# Patient Record
Sex: MALE | Race: WHITE | NOT HISPANIC OR LATINO | URBAN - METROPOLITAN AREA
[De-identification: names, ages, dates, MRNs, and addresses within clinical notes are randomized per-mention and may not be internally consistent; named-entity substitution may affect disease eponyms.]

---

## 2018-10-29 ENCOUNTER — EMERGENCY (EMERGENCY)
Facility: HOSPITAL | Age: 78
LOS: 1 days | Discharge: HOME | End: 2018-10-29
Attending: EMERGENCY MEDICINE
Payer: MEDICARE

## 2018-10-29 VITALS
RESPIRATION RATE: 18 BRPM | TEMPERATURE: 98 F | SYSTOLIC BLOOD PRESSURE: 146 MMHG | HEART RATE: 86 BPM | DIASTOLIC BLOOD PRESSURE: 86 MMHG | OXYGEN SATURATION: 96 %

## 2018-10-29 VITALS — WEIGHT: 188.94 LBS | HEIGHT: 67 IN

## 2018-10-29 DIAGNOSIS — K63.2 FISTULA OF INTESTINE: Chronic | ICD-10-CM

## 2018-10-29 DIAGNOSIS — K25.5 CHRONIC OR UNSPECIFIED GASTRIC ULCER WITH PERFORATION: Chronic | ICD-10-CM

## 2018-10-29 DIAGNOSIS — Z98.890 OTHER SPECIFIED POSTPROCEDURAL STATES: Chronic | ICD-10-CM

## 2018-10-29 LAB
ALBUMIN SERPL ELPH-MCNC: 4.2 G/DL — SIGNIFICANT CHANGE UP (ref 3.3–5.2)
ALP SERPL-CCNC: 52 U/L — SIGNIFICANT CHANGE UP (ref 40–120)
ALT FLD-CCNC: 13 U/L — SIGNIFICANT CHANGE UP
ANION GAP SERPL CALC-SCNC: 11 MMOL/L — SIGNIFICANT CHANGE UP (ref 5–17)
APTT BLD: 35.8 SEC — SIGNIFICANT CHANGE UP (ref 27.5–37.4)
AST SERPL-CCNC: 23 U/L — SIGNIFICANT CHANGE UP
BASOPHILS # BLD AUTO: 0 K/UL — SIGNIFICANT CHANGE UP (ref 0–0.2)
BASOPHILS NFR BLD AUTO: 0.3 % — SIGNIFICANT CHANGE UP (ref 0–2)
BILIRUB SERPL-MCNC: 0.6 MG/DL — SIGNIFICANT CHANGE UP (ref 0.4–2)
BUN SERPL-MCNC: 10 MG/DL — SIGNIFICANT CHANGE UP (ref 8–20)
CALCIUM SERPL-MCNC: 9.9 MG/DL — SIGNIFICANT CHANGE UP (ref 8.6–10.2)
CHLORIDE SERPL-SCNC: 99 MMOL/L — SIGNIFICANT CHANGE UP (ref 98–107)
CO2 SERPL-SCNC: 24 MMOL/L — SIGNIFICANT CHANGE UP (ref 22–29)
CREAT SERPL-MCNC: 0.7 MG/DL — SIGNIFICANT CHANGE UP (ref 0.5–1.3)
EOSINOPHIL # BLD AUTO: 0.1 K/UL — SIGNIFICANT CHANGE UP (ref 0–0.5)
EOSINOPHIL NFR BLD AUTO: 1.7 % — SIGNIFICANT CHANGE UP (ref 0–5)
GLUCOSE SERPL-MCNC: 103 MG/DL — SIGNIFICANT CHANGE UP (ref 70–115)
HCT VFR BLD CALC: 41 % — LOW (ref 42–52)
HGB BLD-MCNC: 14.5 G/DL — SIGNIFICANT CHANGE UP (ref 14–18)
INR BLD: 1.1 RATIO — SIGNIFICANT CHANGE UP (ref 0.88–1.16)
LACTATE BLDV-MCNC: 0.9 MMOL/L — SIGNIFICANT CHANGE UP (ref 0.5–2)
LYMPHOCYTES # BLD AUTO: 1.7 K/UL — SIGNIFICANT CHANGE UP (ref 1–4.8)
LYMPHOCYTES # BLD AUTO: 22.7 % — SIGNIFICANT CHANGE UP (ref 20–55)
MCHC RBC-ENTMCNC: 30.9 PG — SIGNIFICANT CHANGE UP (ref 27–31)
MCHC RBC-ENTMCNC: 35.4 G/DL — SIGNIFICANT CHANGE UP (ref 32–36)
MCV RBC AUTO: 87.2 FL — SIGNIFICANT CHANGE UP (ref 80–94)
MONOCYTES # BLD AUTO: 0.6 K/UL — SIGNIFICANT CHANGE UP (ref 0–0.8)
MONOCYTES NFR BLD AUTO: 7.2 % — SIGNIFICANT CHANGE UP (ref 3–10)
NEUTROPHILS # BLD AUTO: 5.2 K/UL — SIGNIFICANT CHANGE UP (ref 1.8–8)
NEUTROPHILS NFR BLD AUTO: 68 % — SIGNIFICANT CHANGE UP (ref 37–73)
PLATELET # BLD AUTO: 265 K/UL — SIGNIFICANT CHANGE UP (ref 150–400)
POTASSIUM SERPL-MCNC: 4.9 MMOL/L — SIGNIFICANT CHANGE UP (ref 3.5–5.3)
POTASSIUM SERPL-SCNC: 4.9 MMOL/L — SIGNIFICANT CHANGE UP (ref 3.5–5.3)
PROT SERPL-MCNC: 7.1 G/DL — SIGNIFICANT CHANGE UP (ref 6.6–8.7)
PROTHROM AB SERPL-ACNC: 12.1 SEC — SIGNIFICANT CHANGE UP (ref 9.8–12.7)
RBC # BLD: 4.7 M/UL — SIGNIFICANT CHANGE UP (ref 4.6–6.2)
RBC # FLD: 13.8 % — SIGNIFICANT CHANGE UP (ref 11–15.6)
SODIUM SERPL-SCNC: 134 MMOL/L — LOW (ref 135–145)
WBC # BLD: 7.6 K/UL — SIGNIFICANT CHANGE UP (ref 4.8–10.8)
WBC # FLD AUTO: 7.6 K/UL — SIGNIFICANT CHANGE UP (ref 4.8–10.8)

## 2018-10-29 PROCEDURE — 83690 ASSAY OF LIPASE: CPT

## 2018-10-29 PROCEDURE — 85610 PROTHROMBIN TIME: CPT

## 2018-10-29 PROCEDURE — 74177 CT ABD & PELVIS W/CONTRAST: CPT

## 2018-10-29 PROCEDURE — 80053 COMPREHEN METABOLIC PANEL: CPT

## 2018-10-29 PROCEDURE — 96374 THER/PROPH/DIAG INJ IV PUSH: CPT | Mod: XU

## 2018-10-29 PROCEDURE — 36415 COLL VENOUS BLD VENIPUNCTURE: CPT

## 2018-10-29 PROCEDURE — 87040 BLOOD CULTURE FOR BACTERIA: CPT

## 2018-10-29 PROCEDURE — 74177 CT ABD & PELVIS W/CONTRAST: CPT | Mod: 26

## 2018-10-29 PROCEDURE — 85730 THROMBOPLASTIN TIME PARTIAL: CPT

## 2018-10-29 PROCEDURE — 83605 ASSAY OF LACTIC ACID: CPT

## 2018-10-29 PROCEDURE — 85027 COMPLETE CBC AUTOMATED: CPT

## 2018-10-29 PROCEDURE — 99284 EMERGENCY DEPT VISIT MOD MDM: CPT

## 2018-10-29 PROCEDURE — 99284 EMERGENCY DEPT VISIT MOD MDM: CPT | Mod: 25

## 2018-10-29 RX ORDER — FLUOCINONIDE/EMOLLIENT BASE 0.05 %
1 CREAM (GRAM) TOPICAL ONCE
Qty: 0 | Refills: 0 | Status: COMPLETED | OUTPATIENT
Start: 2018-10-29 | End: 2018-10-29

## 2018-10-29 RX ORDER — CEPHALEXIN 500 MG
500 CAPSULE ORAL ONCE
Qty: 0 | Refills: 0 | Status: COMPLETED | OUTPATIENT
Start: 2018-10-29 | End: 2018-10-29

## 2018-10-29 RX ORDER — PIPERACILLIN AND TAZOBACTAM 4; .5 G/20ML; G/20ML
3.38 INJECTION, POWDER, LYOPHILIZED, FOR SOLUTION INTRAVENOUS ONCE
Qty: 0 | Refills: 0 | Status: COMPLETED | OUTPATIENT
Start: 2018-10-29 | End: 2018-10-29

## 2018-10-29 RX ORDER — FLUOCINONIDE/EMOLLIENT BASE 0.05 %
1 CREAM (GRAM) TOPICAL
Qty: 30 | Refills: 0 | OUTPATIENT
Start: 2018-10-29 | End: 2018-11-04

## 2018-10-29 RX ORDER — CEPHALEXIN 500 MG
1 CAPSULE ORAL
Qty: 21 | Refills: 0
Start: 2018-10-29 | End: 2018-11-04

## 2018-10-29 RX ORDER — FLUOCINONIDE/EMOLLIENT BASE 0.05 %
1 CREAM (GRAM) TOPICAL
Qty: 60 | Refills: 0
Start: 2018-10-29 | End: 2018-11-04

## 2018-10-29 RX ADMIN — Medication 500 MILLIGRAM(S): at 17:56

## 2018-10-29 RX ADMIN — PIPERACILLIN AND TAZOBACTAM 200 GRAM(S): 4; .5 INJECTION, POWDER, LYOPHILIZED, FOR SOLUTION INTRAVENOUS at 14:14

## 2018-10-29 RX ADMIN — Medication 1 APPLICATION(S): at 18:01

## 2018-10-29 NOTE — ED ADULT NURSE NOTE - CAS EDN DISCHARGE ASSESSMENT
Head is atraumatic. Head shape is symmetrical. Alert and oriented to person, place and time/Awake/Symptoms improved

## 2018-10-29 NOTE — ED ADULT NURSE NOTE - NSIMPLEMENTINTERV_GEN_ALL_ED
Implemented All Universal Safety Interventions:  [place] to call system. Call bell, personal items and telephone within reach. Instruct patient to call for assistance. Room bathroom lighting operational. Non-slip footwear when patient is off stretcher. Physically safe environment: no spills, clutter or unnecessary equipment. Stretcher in lowest position, wheels locked, appropriate side rails in place.

## 2018-10-29 NOTE — ED STATDOCS - OBJECTIVE STATEMENT
Telemedicine assessment was conducted (using real time 2 way audio-video technology) by Dr. Carl Handley located at [address] [place] [id number]  ++++++++++++++++++++++++  Pertinent patient history and initial plan: 79 y/o M pt presents to the ED c/o abdominal pain from old surgical wound onset 7 days ago. He had an old operation in [place] in the 70's. They cut 3/4 of his stomach and did a pancreatic scrape. 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 The wound was open for 14 months before it closed. It took 14 times to close the wound completely. This was all performed in [place]. Since this time he has had no problems. He used peroxide and bacitracin, and it seemed to be clearing up, but has worsened over the last week. The wound site is red, but dry. It is at the same site where it was draining in the past. Pt states subjective fevers. He takes a cranberry pill, and vitamin D 2,000 mg twice daily, and 81mg of Asprin daily. He took Motrin for the pain with relief. Pt denies N/V/D. No further complaints at this time. Telemedicine assessment was conducted (using real time 2 way audio-video technology) by Dr. Cral Handley located at [address] [place] [id number]  ++++++++++++++++++++++++  Pertinent patient history and initial plan: 79 y/o M pt presents to the ED c/o abdominal pain from old surgical wound onset 7 days ago. He had an old operation in [place] in the 70's. They cut 3/4 of his stomach and did a pancreatic scrape. 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 The wound was open for 14 months before it closed. It took 14 times to close the wound completely. This was all performed in [place]. Since this time he has had no problems. Over the past week, patient noticed redness and swelling to same area. no drainage.  The wound site is red, but dry. It is at the same site where it was draining in the past. Pt states subjective fevers. He takes a cranberry pill, and vitamin D 2,000 mg twice daily, and 81mg of Asprin daily. He took Motrin for the pain with relief. Pt denies N/V/D. No further complaints at this time.    will order, labs, ct to evaluate fistula    Patient seen by me in intake for initial assessment and ordering. Physician on site to follow results and further evaluate and treat patient.

## 2018-10-29 NOTE — ED PROVIDER NOTE - ATTENDING CONTRIBUTION TO CARE
seen with acp: well appearing, normal mucosa, NAD, no icterus; abd soft, NT ND; RLQ with small fistula wound/scar with minimal clear drainage and surrounding dermatitis; labs and imaging reviewed, surg consult noted, agree with acp plan of care

## 2018-10-29 NOTE — ED PROVIDER NOTE - PROGRESS NOTE DETAILS
Pt seen by surgery who felt that drainage was related to local dermatitis to area. Recoomeding Steriod cream and oral abx

## 2018-10-29 NOTE — ED ADULT TRIAGE NOTE - CHIEF COMPLAINT QUOTE
has old abd wound with pancreatic scraping from the 1970's. Has a small abd opening, chronic, Here now for increased drainage and pain.

## 2018-10-29 NOTE — PROGRESS NOTE ADULT - ASSESSMENT
A/P:    Cleared for discharge from surgical standpoint. there is no fistula or collection. The skin at the base of the dimple of former fistula site is intact  -This appears to be a dermatitis reaction, weeping clear serous fluid. Treat for dermatitis, and recommend short course of PO antibiotics  -Patient needs to sign an incidental finding form for the liver mass found; and needs to follow up with his PMD; he was counseled on the importance of doing so    Patient seen by me and Gretta Wang, attending A/P:    Cleared for discharge from surgical standpoint. there is no fistula or collection. The skin at the base of the dimple of former fistula site is intact  -This appears to be a dermatitis reaction, weeping clear serous fluid. Treat for dermatitis, and recommend short course of PO antibiotics  -Patient signed an incidental finding form for the liver mass found; and was advised needs to follow up with his PMD; he was counseled on the importance of doing so, and given a copy of his CT scan result  -Patient may follow up with [facility] surgery clinic in 1 week if he wishes to do so. Contact information was provided    Patient seen by me and Gretta Wang, attending

## 2018-10-29 NOTE — PROGRESS NOTE ADULT - SUBJECTIVE AND OBJECTIVE BOX
INTERVAL HPI/OVERNIGHT EVENTS:    HPI:  79 yo Nepali male presents with concern for drainage from a previous surgical site. He recounts in [facility] in [facility], complicated prolonged hospital course for what he remembers is a "stomach ulcer perforation, which involved my pancreas, they took a shaving of it. I developed a pancreatic fistula and I was in the hospital for a month, very sick."  he recounts being discharged, and multiple readmissions in the months following for incision and drainage of what he recalls is an abscess of a previous fistula site on his right side of the abdomen. He also recalls having several sutures removed from that site.  And in 1974 his symptoms resolved, and he continued in his usual state of health denying any drainage from this former fistula site, until 1 week ago when he reports clear drainage which was alarming to him. he seeks attention today for concern for this drainage.    Since then, he's been treating it with some cream at home, but the drainage increased.  He denies any other symptoms, including denying F/C/SOB/CP, Denies N/V, Denies diarrhea/constipation. Denies blood in BMs. Denies dysuria.     ROS:  HEENT: Denies headache, blurry vision  RESPIRATORY: Denies cough  CARDIAC: Denies chest pain, racing heart  GASTROINTESTINAL: Denies, constipation, diarrhea  GENITOURINARY: Denies dysuria, hematuria  NEUROLOGIC: Denies headaches, dizziness  MUSCULOSKELETAL: Denies muscle weakness  VASCULAR: Denies claudication and cramping.  ENDOCRINOLOGY: Denies heat or cold intolerance  HEMATOLOGY: Denies easy bleeding or blood transfusion.  DERMATOLOGY: Denies changes in moles or pigmentation changes  PSYCHIATRY: Denies depression, agitation or anxiety      PMH: Denies    PSH: as above  +  elective R and L inguinal hernia repairs (open) with mesh, [place] several years ago.    Medications: none    All: NKDA    Soc Hx:  Smokes 1 PPD approx 25 y. Denies illicits    MEDICATIONS  (STANDING):  cephalexin 500 milliGRAM(s) Oral once  fluocinonide 0.05% Cream 1 Application(s) Topical Once      Vital Signs Last 24 Hrs  T(C): 36.9 (29 Oct 2018 15:23), Max: 37.4 (29 Oct 2018 10:42)  T(F): 98.4 (29 Oct 2018 15:23), Max: 99.3 (29 Oct 2018 10:42)  HR: 90 (29 Oct 2018 15:23) (88 - 90)  BP: 149/91 (29 Oct 2018 15:23) (149/91 - 150/83)  BP(mean): --  RR: 18 (29 Oct 2018 15:23) (18 - 18)  SpO2: 95% (29 Oct 2018 15:23) (94% - 95%)    PE  Gen: NAD AAO3, vivacious  Pulm: CTAB, symmetrical  CV: RRR S1/S2  Abd: Well healed midline laparotomy wound. Well healed R sided vertical incision approx 5cm which is centered on a skin dimple, at the midclavicular line at the level of the umbilicus. On probing this dimple, there is intact skin at the base, and it does not appear to be a fistula. There is a dermatitic eruption with weeping vesicles, also centered around this dimple, which does not appear to be a natalie cellulitis. No fluctuance, pus or pain on palpation anywhere in abdomen. The fluid weeping from the skin is clear serous.  Well healed B/L inguinal hernia incisions  : Deferred.  Ext: WWP  Vasc: 2+ pulses x4 ext  Neuro: CN II-XII intact, nonfocal      I&O's Detail      LABS:                        14.5   7.6   )-----------( 265      ( 29 Oct 2018 11:46 )             41.0     10-29    134<L>  |  99  |  10.0  ----------------------------<  103  4.9   |  24.0  |  0.70    Ca    9.9      29 Oct 2018 11:46    TPro  7.1  /  Alb  4.2  /  TBili  0.6  /  DBili  x   /  AST  23  /  ALT  13  /  AlkPhos  52  10-29    PT/INR - ( 29 Oct 2018 12:45 )   PT: 12.1 sec;   INR: 1.10 ratio         PTT - ( 29 Oct 2018 12:45 )  PTT:35.8 sec      RADIOLOGY & ADDITIONAL STUDIES:    CT A/P:  2.2x 1.5cm solid mass in R lobe of Liver  -No fistula, no hernia or collection

## 2018-10-29 NOTE — ED PROVIDER NOTE - PSH
No significant past surgical history Abdominal fistula    Gastric perforation    H/O bilateral inguinal hernia repair

## 2018-10-29 NOTE — CHART NOTE - NSCHARTNOTEFT_GEN_A_CORE
Full surgical consult note to follow.    78M with complicated surgical history nearly 40 years ago in [place] for perforated ulcer requiring multiple operations complicated by necrotizing pancreatitis requiring multiple debridements and then post operative pancreatic fistula that required several months of management and fistulectomy.  Since 40 years ago, he reports no problems. Eating well. Mild constipation but other wise normal bowel function. No fevers or chills. He comes to ER because he noted itchiness and redness around previous drain site of right mid abdomen with small amount of serous drainage. he states it felt like a bug bite, very itchy around the site and then due to concerns of recurrent fistula, presented to the ED.  HE reports no fevers, chills, nausea, abdominal pain, diarrhea, food intolerance. Mild constipation.    Vitals: AVSS  Constitutional: NAD, AAO3  HEENT: PERRL, EOMI  CV: RRR  RESP: CTAB  GI: soft, nondistended, nontender, no guarding, no rebound, well healed midline laparotomy scar, right mid abdomen with well healed drain site scar that indents inward surrounded by erythema and mild serous drainage from visible skin breaks.  Gently probed bottom of the drain site. It is well healed with no evidence of fistulous output. No opening appreciated. No surrounding evidence of underlying fluctuance, infection, or fluid collection.  Small bullae? superficial just superior to drain site scar.  MSK: no edema  Neuro: no focal deficits    No leukocytosis  BMP unremarkable  CT scan: no intraaabodminal pathology appreciated. no fluid collections. no evidence of fistulous tracks. No inflammation of subcutaneous tissue.    A/p 78M with dermatitis of right abdomen  - no evidence of fistula on physical exam or CT scan.  Scant serous fluid appears to be small superfical breaks of skin noted with extensive erythema of right abdomen. Small bullae? noted near drain site scar may possibly due to bug bite? Unclear etiology for itchy rash of right abdomen but there is no evidence of infection nor fistulous opening nor fluctuance requiring intervention.  - no acute surgical intervention indicated  - recommend treatment for dermatitis/cellulitis of abdomen  - if patient has on going concerns of scar sites or has change in abdominal exam, patient can follow up in [facility] clinic.

## 2018-10-29 NOTE — ED ADULT NURSE NOTE - OBJECTIVE STATEMENT
age appropriate
Patient A&OX4, has old abd wound with pancreatic scraping from the 1970's. Has a small abd opening. Here now for increased drainage. redness noted around the wound. .

## 2018-10-29 NOTE — ED PROVIDER NOTE - OBJECTIVE STATEMENT
77 y/o M denies any PMH  presents to the ED c/o abdominal pain from old surgical wound onset 7 days ago. He had gastric ulcer/perf  in [place] in the 70's and states that they cut 3/4 of his stomach and did a pancreatic scrape.  Pt then developed a fistula- had multiple surgery to close the fistula. Since this time he has had no problems. Over the past week, patient noticed redness and swelling to same area with drainage. + Subjective fevers. No N/V/D/C  Meds- vitamin D 2,000 mg twice daily, and 81mg of Asprin daily.

## 2018-11-03 LAB
CULTURE RESULTS: SIGNIFICANT CHANGE UP
CULTURE RESULTS: SIGNIFICANT CHANGE UP
SPECIMEN SOURCE: SIGNIFICANT CHANGE UP
SPECIMEN SOURCE: SIGNIFICANT CHANGE UP

## 2018-11-24 ENCOUNTER — EMERGENCY (EMERGENCY)
Facility: HOSPITAL | Age: 78
LOS: 1 days | Discharge: HOME | End: 2018-11-24
Attending: EMERGENCY MEDICINE
Payer: MEDICARE

## 2018-11-24 VITALS
OXYGEN SATURATION: 99 % | HEART RATE: 68 BPM | RESPIRATION RATE: 18 BRPM | DIASTOLIC BLOOD PRESSURE: 72 MMHG | SYSTOLIC BLOOD PRESSURE: 162 MMHG

## 2018-11-24 VITALS — WEIGHT: 143.96 LBS

## 2018-11-24 DIAGNOSIS — K63.2 FISTULA OF INTESTINE: Chronic | ICD-10-CM

## 2018-11-24 DIAGNOSIS — Z98.890 OTHER SPECIFIED POSTPROCEDURAL STATES: Chronic | ICD-10-CM

## 2018-11-24 DIAGNOSIS — K25.5 CHRONIC OR UNSPECIFIED GASTRIC ULCER WITH PERFORATION: Chronic | ICD-10-CM

## 2018-11-24 LAB
ALBUMIN SERPL ELPH-MCNC: 5 G/DL — SIGNIFICANT CHANGE UP (ref 3.3–5.2)
ALP SERPL-CCNC: 59 U/L — SIGNIFICANT CHANGE UP (ref 40–120)
ALT FLD-CCNC: 12 U/L — SIGNIFICANT CHANGE UP
ANION GAP SERPL CALC-SCNC: 11 MMOL/L — SIGNIFICANT CHANGE UP (ref 5–17)
APPEARANCE UR: CLEAR — SIGNIFICANT CHANGE UP
APTT BLD: 37.9 SEC — HIGH (ref 27.5–36.3)
AST SERPL-CCNC: 19 U/L — SIGNIFICANT CHANGE UP
BACTERIA # UR AUTO: ABNORMAL
BASOPHILS # BLD AUTO: 0 K/UL — SIGNIFICANT CHANGE UP (ref 0–0.2)
BASOPHILS NFR BLD AUTO: 0.2 % — SIGNIFICANT CHANGE UP (ref 0–2)
BILIRUB SERPL-MCNC: 0.6 MG/DL — SIGNIFICANT CHANGE UP (ref 0.4–2)
BILIRUB UR-MCNC: NEGATIVE — SIGNIFICANT CHANGE UP
BUN SERPL-MCNC: 11 MG/DL — SIGNIFICANT CHANGE UP (ref 8–20)
CALCIUM SERPL-MCNC: 10.1 MG/DL — SIGNIFICANT CHANGE UP (ref 8.6–10.2)
CHLORIDE SERPL-SCNC: 96 MMOL/L — LOW (ref 98–107)
CO2 SERPL-SCNC: 29 MMOL/L — SIGNIFICANT CHANGE UP (ref 22–29)
COLOR SPEC: YELLOW — SIGNIFICANT CHANGE UP
COMMENT - URINE: SIGNIFICANT CHANGE UP
CREAT SERPL-MCNC: 0.72 MG/DL — SIGNIFICANT CHANGE UP (ref 0.5–1.3)
DIFF PNL FLD: ABNORMAL
EOSINOPHIL # BLD AUTO: 0.4 K/UL — SIGNIFICANT CHANGE UP (ref 0–0.5)
EOSINOPHIL NFR BLD AUTO: 4.6 % — SIGNIFICANT CHANGE UP (ref 0–5)
EPI CELLS # UR: SIGNIFICANT CHANGE UP
GLUCOSE SERPL-MCNC: 90 MG/DL — SIGNIFICANT CHANGE UP (ref 70–115)
GLUCOSE UR QL: NEGATIVE MG/DL — SIGNIFICANT CHANGE UP
HCT VFR BLD CALC: 47.1 % — SIGNIFICANT CHANGE UP (ref 42–52)
HGB BLD-MCNC: 16.3 G/DL — SIGNIFICANT CHANGE UP (ref 14–18)
INR BLD: 1.05 RATIO — SIGNIFICANT CHANGE UP (ref 0.88–1.16)
KETONES UR-MCNC: NEGATIVE — SIGNIFICANT CHANGE UP
LACTATE BLDV-MCNC: 0.9 MMOL/L — SIGNIFICANT CHANGE UP (ref 0.5–2)
LEUKOCYTE ESTERASE UR-ACNC: ABNORMAL
LIDOCAIN IGE QN: 26 U/L — SIGNIFICANT CHANGE UP (ref 22–51)
LYMPHOCYTES # BLD AUTO: 2.1 K/UL — SIGNIFICANT CHANGE UP (ref 1–4.8)
LYMPHOCYTES # BLD AUTO: 25.3 % — SIGNIFICANT CHANGE UP (ref 20–55)
MAGNESIUM SERPL-MCNC: 1.8 MG/DL — SIGNIFICANT CHANGE UP (ref 1.6–2.6)
MCHC RBC-ENTMCNC: 31 PG — SIGNIFICANT CHANGE UP (ref 27–31)
MCHC RBC-ENTMCNC: 34.6 G/DL — SIGNIFICANT CHANGE UP (ref 32–36)
MCV RBC AUTO: 89.5 FL — SIGNIFICANT CHANGE UP (ref 80–94)
MONOCYTES # BLD AUTO: 0.6 K/UL — SIGNIFICANT CHANGE UP (ref 0–0.8)
MONOCYTES NFR BLD AUTO: 7.9 % — SIGNIFICANT CHANGE UP (ref 3–10)
NEUTROPHILS # BLD AUTO: 5 K/UL — SIGNIFICANT CHANGE UP (ref 1.8–8)
NEUTROPHILS NFR BLD AUTO: 61.9 % — SIGNIFICANT CHANGE UP (ref 37–73)
NITRITE UR-MCNC: NEGATIVE — SIGNIFICANT CHANGE UP
PH UR: 7 — SIGNIFICANT CHANGE UP (ref 5–8)
PLATELET # BLD AUTO: 261 K/UL — SIGNIFICANT CHANGE UP (ref 150–400)
POTASSIUM SERPL-MCNC: 4.8 MMOL/L — SIGNIFICANT CHANGE UP (ref 3.5–5.3)
POTASSIUM SERPL-SCNC: 4.8 MMOL/L — SIGNIFICANT CHANGE UP (ref 3.5–5.3)
PROT SERPL-MCNC: 8 G/DL — SIGNIFICANT CHANGE UP (ref 6.6–8.7)
PROT UR-MCNC: 15 MG/DL
PROTHROM AB SERPL-ACNC: 12.1 SEC — SIGNIFICANT CHANGE UP (ref 10–12.9)
RBC # BLD: 5.26 M/UL — SIGNIFICANT CHANGE UP (ref 4.6–6.2)
RBC # FLD: 14 % — SIGNIFICANT CHANGE UP (ref 11–15.6)
RBC CASTS # UR COMP ASSIST: SIGNIFICANT CHANGE UP /HPF (ref 0–4)
SODIUM SERPL-SCNC: 136 MMOL/L — SIGNIFICANT CHANGE UP (ref 135–145)
SP GR SPEC: 1.01 — SIGNIFICANT CHANGE UP (ref 1.01–1.02)
TROPONIN T SERPL-MCNC: <0.01 NG/ML — SIGNIFICANT CHANGE UP (ref 0–0.06)
UROBILINOGEN FLD QL: NEGATIVE MG/DL — SIGNIFICANT CHANGE UP
WBC # BLD: 8.1 K/UL — SIGNIFICANT CHANGE UP (ref 4.8–10.8)
WBC # FLD AUTO: 8.1 K/UL — SIGNIFICANT CHANGE UP (ref 4.8–10.8)
WBC UR QL: SIGNIFICANT CHANGE UP

## 2018-11-24 PROCEDURE — 36415 COLL VENOUS BLD VENIPUNCTURE: CPT

## 2018-11-24 PROCEDURE — 85730 THROMBOPLASTIN TIME PARTIAL: CPT

## 2018-11-24 PROCEDURE — 84484 ASSAY OF TROPONIN QUANT: CPT

## 2018-11-24 PROCEDURE — 96374 THER/PROPH/DIAG INJ IV PUSH: CPT | Mod: XU

## 2018-11-24 PROCEDURE — 83605 ASSAY OF LACTIC ACID: CPT

## 2018-11-24 PROCEDURE — 99284 EMERGENCY DEPT VISIT MOD MDM: CPT | Mod: 25

## 2018-11-24 PROCEDURE — 87086 URINE CULTURE/COLONY COUNT: CPT

## 2018-11-24 PROCEDURE — 74177 CT ABD & PELVIS W/CONTRAST: CPT | Mod: 26

## 2018-11-24 PROCEDURE — 81001 URINALYSIS AUTO W/SCOPE: CPT

## 2018-11-24 PROCEDURE — 83690 ASSAY OF LIPASE: CPT

## 2018-11-24 PROCEDURE — 85610 PROTHROMBIN TIME: CPT

## 2018-11-24 PROCEDURE — 85027 COMPLETE CBC AUTOMATED: CPT

## 2018-11-24 PROCEDURE — 74177 CT ABD & PELVIS W/CONTRAST: CPT

## 2018-11-24 PROCEDURE — 83735 ASSAY OF MAGNESIUM: CPT

## 2018-11-24 PROCEDURE — 99284 EMERGENCY DEPT VISIT MOD MDM: CPT

## 2018-11-24 PROCEDURE — 80053 COMPREHEN METABOLIC PANEL: CPT

## 2018-11-24 PROCEDURE — 96375 TX/PRO/DX INJ NEW DRUG ADDON: CPT

## 2018-11-24 RX ORDER — SODIUM CHLORIDE 9 MG/ML
1000 INJECTION INTRAMUSCULAR; INTRAVENOUS; SUBCUTANEOUS
Qty: 0 | Refills: 0 | Status: DISCONTINUED | OUTPATIENT
Start: 2018-11-24 | End: 2018-11-29

## 2018-11-24 RX ORDER — AMPICILLIN SODIUM AND SULBACTAM SODIUM 250; 125 MG/ML; MG/ML
3 INJECTION, POWDER, FOR SUSPENSION INTRAMUSCULAR; INTRAVENOUS ONCE
Qty: 0 | Refills: 0 | Status: COMPLETED | OUTPATIENT
Start: 2018-11-24 | End: 2018-11-24

## 2018-11-24 RX ORDER — FENTANYL CITRATE 50 UG/ML
12.5 INJECTION INTRAVENOUS ONCE
Qty: 0 | Refills: 0 | Status: DISCONTINUED | OUTPATIENT
Start: 2018-11-24 | End: 2018-11-24

## 2018-11-24 RX ADMIN — SODIUM CHLORIDE 100 MILLILITER(S): 9 INJECTION INTRAMUSCULAR; INTRAVENOUS; SUBCUTANEOUS at 06:57

## 2018-11-24 RX ADMIN — AMPICILLIN SODIUM AND SULBACTAM SODIUM 200 GRAM(S): 250; 125 INJECTION, POWDER, FOR SUSPENSION INTRAMUSCULAR; INTRAVENOUS at 12:01

## 2018-11-24 RX ADMIN — FENTANYL CITRATE 12.5 MICROGRAM(S): 50 INJECTION INTRAVENOUS at 09:46

## 2018-11-24 NOTE — ED PROVIDER NOTE - NS ED ROS FT
no weight change, no fever or chills  no recent travel, no recent abox, no sick contacts  no recent change in medications  no rash, no bruises  no visual changes no eye discharge  no cough cold or congestion,   no sob, no chest pain  no orthopnea, no pnd  +abd pain, no n/v/d  no hematuria, no change in urinary habits  no joint pain, no deformity  no headache, no paresthesia

## 2018-11-24 NOTE — ED PROVIDER NOTE - OBJECTIVE STATEMENT
78y old with h/o gastric ulcer, has fistula t that time. has been doing well. was seen seen in ed few days ago recd a medication, now repeat visit, complaints od ruq tenderness at one point

## 2018-11-24 NOTE — ED ADULT NURSE NOTE - NSIMPLEMENTINTERV_GEN_ALL_ED
Implemented All Universal Safety Interventions:  [place] to call system. Call bell, personal items and telephone within reach. Instruct patient to call for assistance. Room bathroom lighting operational. Non-slip footwear when patient is off stretcher. Physically safe environment: no spills, clutter or unnecessary equipment. Stretcher in lowest position, wheels locked, appropriate side rails in place.

## 2018-11-24 NOTE — ED ADULT NURSE REASSESSMENT NOTE - NS ED NURSE REASSESS COMMENT FT1
pt care assumed at 0730, no apparent distress noted at this time, charting as noted. pt received Alert and Oriented to person, place, situation and time sitting in bed comfortably. pt a/o abd pain. pt is currently awaiting ct scan. urine sent at this time. pt educated on plan of care, pt able to successfully teach back plan of care to RN, RN will continue to reeducate pt during hospital stay.

## 2018-11-24 NOTE — ED PROVIDER NOTE - CARE PLAN
Principal Discharge DX:	Abdominal wall cellulitis Principal Discharge DX:	Abdominal wall cellulitis  Secondary Diagnosis:	Abdominal pain, unspecified abdominal location

## 2018-11-24 NOTE — ED ADULT NURSE NOTE - OBJECTIVE STATEMENT
Patient A&Ox4 complaining of pain to abdominal area secondary to rash. Patient describes pain as burning like sensation. Patient presents with redness & scaly patches to mid-abdominal area. Stated was seen in ED appx 30 days ago, was discharged with PO ABX & topical cream. Stated rash has gotten worse, however has remained exclusive to abdominal area only. Discharge instructions from previous ED visit shows patient discharged with contact dermatitis.

## 2018-11-24 NOTE — ED PROVIDER NOTE - PHYSICAL EXAMINATION
Constitutional : Appears comfortably, talking in full sentences  Head :NC AT , no swelling  Eyes :eomi, no swelling  Mouth :mm moist,  Neck : supple, trachea in midline  Chest :Erick air entry, symm chest expansion, no distress  Heart :S1 S2 distant  Abdomen :abd soft, right upper quadrant  tenderness  right mid abd a fistula, guaze is dry, no groin erythema  erythema rectangula on right side of abd  with yellow heads, appears warm, no crops, no blisters noted  Musc/Skel :ext no swelling, no deformity, no spine tenderness, distal pulses present  Neuro  :AAO 3 no focal deficits

## 2018-11-25 LAB
CULTURE RESULTS: NO GROWTH — SIGNIFICANT CHANGE UP
SPECIMEN SOURCE: SIGNIFICANT CHANGE UP

## 2018-11-28 PROBLEM — Z00.00 ENCOUNTER FOR PREVENTIVE HEALTH EXAMINATION: Status: ACTIVE | Noted: 2018-11-28

## 2018-12-06 ENCOUNTER — APPOINTMENT (OUTPATIENT)
Dept: DERMATOLOGY | Facility: CLINIC | Age: 78
End: 2018-12-06
Payer: MEDICARE

## 2018-12-06 PROCEDURE — 99202 OFFICE O/P NEW SF 15 MIN: CPT

## 2019-02-06 ENCOUNTER — APPOINTMENT (OUTPATIENT)
Dept: DERMATOLOGY | Facility: CLINIC | Age: 79
End: 2019-02-06

## 2021-03-29 ENCOUNTER — EMERGENCY (EMERGENCY)
Facility: HOSPITAL | Age: 81
LOS: 1 days | Discharge: HOME | End: 2021-03-29
Attending: EMERGENCY MEDICINE
Payer: MEDICARE

## 2021-03-29 VITALS
HEIGHT: 67 IN | WEIGHT: 139.99 LBS | SYSTOLIC BLOOD PRESSURE: 180 MMHG | DIASTOLIC BLOOD PRESSURE: 80 MMHG | HEART RATE: 84 BPM | OXYGEN SATURATION: 98 % | RESPIRATION RATE: 18 BRPM | TEMPERATURE: 98 F

## 2021-03-29 VITALS
RESPIRATION RATE: 18 BRPM | SYSTOLIC BLOOD PRESSURE: 151 MMHG | OXYGEN SATURATION: 97 % | DIASTOLIC BLOOD PRESSURE: 72 MMHG | HEART RATE: 79 BPM

## 2021-03-29 DIAGNOSIS — K63.2 FISTULA OF INTESTINE: Chronic | ICD-10-CM

## 2021-03-29 DIAGNOSIS — K25.5 CHRONIC OR UNSPECIFIED GASTRIC ULCER WITH PERFORATION: Chronic | ICD-10-CM

## 2021-03-29 DIAGNOSIS — Z98.890 OTHER SPECIFIED POSTPROCEDURAL STATES: Chronic | ICD-10-CM

## 2021-03-29 LAB
ALBUMIN SERPL ELPH-MCNC: 4.2 G/DL — SIGNIFICANT CHANGE UP (ref 3.3–5.2)
ALP SERPL-CCNC: 49 U/L — SIGNIFICANT CHANGE UP (ref 40–120)
ALT FLD-CCNC: 14 U/L — SIGNIFICANT CHANGE UP
ANION GAP SERPL CALC-SCNC: 9 MMOL/L — SIGNIFICANT CHANGE UP (ref 5–17)
AST SERPL-CCNC: 18 U/L — SIGNIFICANT CHANGE UP
BASOPHILS # BLD AUTO: 0.07 K/UL — SIGNIFICANT CHANGE UP (ref 0–0.2)
BASOPHILS NFR BLD AUTO: 0.9 % — SIGNIFICANT CHANGE UP (ref 0–2)
BILIRUB SERPL-MCNC: 0.3 MG/DL — LOW (ref 0.4–2)
BUN SERPL-MCNC: 10 MG/DL — SIGNIFICANT CHANGE UP (ref 8–20)
CALCIUM SERPL-MCNC: 9.4 MG/DL — SIGNIFICANT CHANGE UP (ref 8.6–10.2)
CHLORIDE SERPL-SCNC: 101 MMOL/L — SIGNIFICANT CHANGE UP (ref 98–107)
CO2 SERPL-SCNC: 28 MMOL/L — SIGNIFICANT CHANGE UP (ref 22–29)
CREAT SERPL-MCNC: 0.72 MG/DL — SIGNIFICANT CHANGE UP (ref 0.5–1.3)
EOSINOPHIL # BLD AUTO: 0.44 K/UL — SIGNIFICANT CHANGE UP (ref 0–0.5)
EOSINOPHIL NFR BLD AUTO: 5.4 % — SIGNIFICANT CHANGE UP (ref 0–6)
GLUCOSE SERPL-MCNC: 108 MG/DL — HIGH (ref 70–99)
HCT VFR BLD CALC: 42.4 % — SIGNIFICANT CHANGE UP (ref 39–50)
HGB BLD-MCNC: 14.8 G/DL — SIGNIFICANT CHANGE UP (ref 13–17)
IMM GRANULOCYTES NFR BLD AUTO: 0.4 % — SIGNIFICANT CHANGE UP (ref 0–1.5)
LACTATE BLDV-MCNC: 1 MMOL/L — SIGNIFICANT CHANGE UP (ref 0.5–2)
LYMPHOCYTES # BLD AUTO: 1.83 K/UL — SIGNIFICANT CHANGE UP (ref 1–3.3)
LYMPHOCYTES # BLD AUTO: 22.6 % — SIGNIFICANT CHANGE UP (ref 13–44)
MCHC RBC-ENTMCNC: 30.8 PG — SIGNIFICANT CHANGE UP (ref 27–34)
MCHC RBC-ENTMCNC: 34.9 GM/DL — SIGNIFICANT CHANGE UP (ref 32–36)
MCV RBC AUTO: 88.1 FL — SIGNIFICANT CHANGE UP (ref 80–100)
MONOCYTES # BLD AUTO: 0.91 K/UL — HIGH (ref 0–0.9)
MONOCYTES NFR BLD AUTO: 11.2 % — SIGNIFICANT CHANGE UP (ref 2–14)
NEUTROPHILS # BLD AUTO: 4.82 K/UL — SIGNIFICANT CHANGE UP (ref 1.8–7.4)
NEUTROPHILS NFR BLD AUTO: 59.5 % — SIGNIFICANT CHANGE UP (ref 43–77)
PLATELET # BLD AUTO: 277 K/UL — SIGNIFICANT CHANGE UP (ref 150–400)
POTASSIUM SERPL-MCNC: 4.5 MMOL/L — SIGNIFICANT CHANGE UP (ref 3.5–5.3)
POTASSIUM SERPL-SCNC: 4.5 MMOL/L — SIGNIFICANT CHANGE UP (ref 3.5–5.3)
PROT SERPL-MCNC: 6.7 G/DL — SIGNIFICANT CHANGE UP (ref 6.6–8.7)
RAPID RVP RESULT: SIGNIFICANT CHANGE UP
RBC # BLD: 4.81 M/UL — SIGNIFICANT CHANGE UP (ref 4.2–5.8)
RBC # FLD: 13.1 % — SIGNIFICANT CHANGE UP (ref 10.3–14.5)
SARS-COV-2 RNA SPEC QL NAA+PROBE: SIGNIFICANT CHANGE UP
SODIUM SERPL-SCNC: 138 MMOL/L — SIGNIFICANT CHANGE UP (ref 135–145)
WBC # BLD: 8.1 K/UL — SIGNIFICANT CHANGE UP (ref 3.8–10.5)
WBC # FLD AUTO: 8.1 K/UL — SIGNIFICANT CHANGE UP (ref 3.8–10.5)

## 2021-03-29 PROCEDURE — 87040 BLOOD CULTURE FOR BACTERIA: CPT

## 2021-03-29 PROCEDURE — 99284 EMERGENCY DEPT VISIT MOD MDM: CPT | Mod: CS

## 2021-03-29 PROCEDURE — 73130 X-RAY EXAM OF HAND: CPT

## 2021-03-29 PROCEDURE — 99284 EMERGENCY DEPT VISIT MOD MDM: CPT | Mod: 25

## 2021-03-29 PROCEDURE — 36415 COLL VENOUS BLD VENIPUNCTURE: CPT

## 2021-03-29 PROCEDURE — 80053 COMPREHEN METABOLIC PANEL: CPT

## 2021-03-29 PROCEDURE — 0225U NFCT DS DNA&RNA 21 SARSCOV2: CPT

## 2021-03-29 PROCEDURE — 85025 COMPLETE CBC W/AUTO DIFF WBC: CPT

## 2021-03-29 PROCEDURE — 83605 ASSAY OF LACTIC ACID: CPT

## 2021-03-29 PROCEDURE — 96375 TX/PRO/DX INJ NEW DRUG ADDON: CPT

## 2021-03-29 PROCEDURE — 73130 X-RAY EXAM OF HAND: CPT | Mod: 26,LT

## 2021-03-29 PROCEDURE — 96374 THER/PROPH/DIAG INJ IV PUSH: CPT

## 2021-03-29 RX ORDER — VANCOMYCIN HCL 1 G
1000 VIAL (EA) INTRAVENOUS ONCE
Refills: 0 | Status: COMPLETED | OUTPATIENT
Start: 2021-03-29 | End: 2021-03-29

## 2021-03-29 RX ORDER — CIPROFLOXACIN LACTATE 400MG/40ML
1 VIAL (ML) INTRAVENOUS
Qty: 10 | Refills: 0
Start: 2021-03-29 | End: 2021-04-07

## 2021-03-29 RX ORDER — PIPERACILLIN AND TAZOBACTAM 4; .5 G/20ML; G/20ML
3.38 INJECTION, POWDER, LYOPHILIZED, FOR SOLUTION INTRAVENOUS ONCE
Refills: 0 | Status: COMPLETED | OUTPATIENT
Start: 2021-03-29 | End: 2021-03-29

## 2021-03-29 RX ADMIN — PIPERACILLIN AND TAZOBACTAM 200 GRAM(S): 4; .5 INJECTION, POWDER, LYOPHILIZED, FOR SOLUTION INTRAVENOUS at 06:03

## 2021-03-29 RX ADMIN — Medication 250 MILLIGRAM(S): at 06:52

## 2021-03-29 NOTE — ED PROVIDER NOTE - MUSCULOSKELETAL, MLM
Spine appears normal, range of motion is not limited, no muscle or joint tenderness. +edema, ecchymosis to the 5th digit on L, skim cracked on extensor surface, no warmth, discharge, not held in flexion, no pain witth passive extension, not tender to flexor sheath

## 2021-03-29 NOTE — ED ADULT NURSE NOTE - OBJECTIVE STATEMENT
Pt is A&Ox4, in NAD. Pt presents to ED c/o injury to left pinky. Pt states he tripped and fell a couple of weeks ago and landed on his left hand. States he followed up with his PCP for the injury and was prescribed a course of Keflex that he finished on 3/22/ States the finger has become increasingly purple since. Left fifth digit appears purple and swollen. Pt medicated as per orders. ED workup in progress.

## 2021-03-29 NOTE — ED PROVIDER NOTE - NSFOLLOWUPINSTRUCTIONS_ED_ALL_ED_FT
1. Keep the finger elevated above the heart  2. Three times a day perform a soak with betadine and warm water  3. after soak apply bacitracin ointment. Also throughout day apply moisturizer to dry, cracked skin  4. Take antibioics as prescribed  5. follow up with the hand surgeon

## 2021-03-29 NOTE — ED PROVIDER NOTE - CARE PROVIDER_API CALL
Shamika Reynoso)  Orthopedics  [address] [place] [address], [address]  [place] [address]  Phone: [phone]  Fax: [phone]  Follow Up Time:

## 2021-03-29 NOTE — ED ADULT TRIAGE NOTE - CHIEF COMPLAINT QUOTE
patient states that he fell > 2 weeks ago, complaining of left hand/ finger pain, 5th digit purple in color

## 2021-03-29 NOTE — ED PROVIDER NOTE - OBJECTIVE STATEMENT
pt is a 80 y/o male presenting to the ed for evaluation. pt states three weeks ago tripped fell onto his left hand, 5th digit was had abrasion. pt states since then the finger has changed into a purple color. pt went to his PMD was prescribed keflex on the 22nd, finished the course. pt denies cp sob headache neck pain visual changes numbness or loss of sensation abd pain nausea vomiting. pt takes moltrin daily

## 2021-03-29 NOTE — ED PROVIDER NOTE - PATIENT PORTAL LINK FT
You can access the FollowMyHealth Patient Portal offered by [facility] by registering at the following website: [email or web address]. By joining [facility]’s FollowMyHealth portal, you will also be able to view your health information using other applications (apps) compatible with our system.

## 2021-03-29 NOTE — CONSULT NOTE ADULT - SUBJECTIVE AND OBJECTIVE BOX
ORTHO-HAND SERVICE  Pt Name: CHARLINE DELATORRE    MRN: [id number]    Patient is a 81y Male presenting to the emergency department with a chief complaint of left fifth digit pain/redness. Patient reports he sustained injury about 2 weeks ago when he slipped and fell onto finger. Reports 2 superficial abrasions at that time. Patient tried to manage injury without medical care. Reports finger became more erythematous during week so decided to go to PCP who prescribed keflex for one week. Patient reports he took keflex as prescribed but finger did not improve. Reports bloody discharge from wound. Denies pus or other discharge. Denies fever. Denies acute sensory/motor changes. Patient is current smoker. Denies DM.       REVIEW OF SYSTEMS  General: Alert, responsive, in NAD  Skin/Breast: as above  Ophthalmologic: No visual changes. No redness. 	  ENMT:	No discharge. No swelling.  Respiratory and Thorax: No difficulty breathing. No cough.	   Cardiovascular: No chest pain. No palpitations.  Gastrointestinal: No abdominal pain. No diarrhea.   Genitourinary: No dysuria. No bleeding.  Musculoskeletal: SEE HPI.  Neurological: No sensory or motor changes.   ROS is otherwise negative.    PAST MEDICAL & SURGICAL HISTORY:  PAST MEDICAL & SURGICAL HISTORY:  No pertinent past medical history  Abdominal fistula  Gastric perforation    H/O bilateral inguinal hernia repair    Allergies: No Known Allergies    Medications:     FAMILY HISTORY:  No pertinent family history in first degree relatives    : non-contributory    Social History:       Daily Height in cm: 170.18 (29 Mar 2021 05:22)    Daily                             14.8   8.10  )-----------( 277      ( 29 Mar 2021 06:02 )             42.4       03-29    138  |  101  |  10.0  ----------------------------<  108<H>  4.5   |  28.0  |  0.72    Ca    9.4      29 Mar 2021 06:02    TPro  6.7  /  Alb  4.2  /  TBili  0.3<L>  /  DBili  x   /  AST  18  /  ALT  14  /  AlkPhos  49  03-29      PHYSICAL EXAM:    Appearance: Alert, responsive, in no acute distress.    Injured extremity (LUE):  Skin: Erythema/excoriations noted at dorsal aspect of 5th digit distal to MCPJ. No active bleeding/drainage. Petechiae noted on good aspect of 5th digit distal to DIP. No fluctuance appreciated throughout digit.  Neurological: Sensation is grossly intact to light touch.   Motor: Limited flexion of 5th digit secondary to pain. other digits FROM.   Vascular: 2+ distal pulses. Cap refill < 2 sec. No signs of venous  insufficiency or stasis.     Imaging Studies:  Xray negative    A/P:  Pt is a 81y Male with left fifth digit pain/erythema x 2 weeks.     PLAN:   * Betadine soaks BID  * Rec wound care  * Defer abx to ID  * Ortho stable for d/c  * f/u Dr. Reynoso within week  * case and imaging d/w Dr. Reynoso

## 2021-03-29 NOTE — ED PROVIDER NOTE - CLINICAL SUMMARY MEDICAL DECISION MAKING FREE TEXT BOX
Pt with no tenderness along the flexor sheath, not held in flexion and able to passively extend without difficulty. cap refill and sensation intact. Seen by hand, who advise no drainable collection and stable for dc and outpt f/u. Advised local wound care and betadine soaks and will increase abx coverage. The pt states that the swelling and ROM have improved while here

## 2021-03-29 NOTE — ED PROVIDER NOTE - ATTENDING CONTRIBUTION TO CARE
82yo male with no pmh presents with pain to left 5th digit. Pt state he fell over a week ago and cut his finger since then has been having worsening discoloration of the finger, was given a course of cephalexin which he completed but has not helped. Pt denies fevers/chills, ha, loc, focal neuro deficits, cp/sob/palp, cough, abd pain/n/v/d, urinary symptoms, recent travel and sick contacts.  labs, xray, abx, hand consult

## 2021-03-29 NOTE — ED PROVIDER NOTE - INCLUDE COVID-19 DISCHARGE INSTRUCTIONS
CW,    Please see [facility] [employer] message below.  Please address due to SN's absence.    Patient was given Ventolin inhaler (x1) 1/18/19 for cough.  Advised telephone visit.    See patient response below.    Thanks,  Gayle Slater RN     <-------- Click here to INCLUDE CoVID-19 Discharge Instructions

## 2022-02-13 ENCOUNTER — EMERGENCY (EMERGENCY)
Facility: HOSPITAL | Age: 82
LOS: 1 days | Discharge: HOME | End: 2022-02-13
Attending: STUDENT IN AN ORGANIZED HEALTH CARE EDUCATION/TRAINING PROGRAM | Admitting: EMERGENCY MEDICINE
Payer: MEDICARE

## 2022-02-13 VITALS
DIASTOLIC BLOOD PRESSURE: 98 MMHG | WEIGHT: 149.91 LBS | SYSTOLIC BLOOD PRESSURE: 139 MMHG | RESPIRATION RATE: 16 BRPM | TEMPERATURE: 98 F | OXYGEN SATURATION: 100 % | HEART RATE: 115 BPM | HEIGHT: 67 IN

## 2022-02-13 VITALS — DIASTOLIC BLOOD PRESSURE: 86 MMHG | SYSTOLIC BLOOD PRESSURE: 140 MMHG | HEART RATE: 96 BPM

## 2022-02-13 DIAGNOSIS — K25.5 CHRONIC OR UNSPECIFIED GASTRIC ULCER WITH PERFORATION: Chronic | ICD-10-CM

## 2022-02-13 DIAGNOSIS — Z98.890 OTHER SPECIFIED POSTPROCEDURAL STATES: Chronic | ICD-10-CM

## 2022-02-13 DIAGNOSIS — K63.2 FISTULA OF INTESTINE: Chronic | ICD-10-CM

## 2022-02-13 PROCEDURE — 99283 EMERGENCY DEPT VISIT LOW MDM: CPT | Mod: 25

## 2022-02-13 NOTE — ED PROVIDER NOTE - ATTENDING CONTRIBUTION TO CARE
I performed a face to face history and physical exam of the patient and discussed their management with the student/resident/ACP. I reviewed the student/resident/ACP's note and agree with the documented findings and plan of care.    giang replaced.  urine slightly blood tinged in bag. no clots. will d/c with uro f/up and return instructions.

## 2022-02-13 NOTE — ED ADULT TRIAGE NOTE - CHIEF COMPLAINT QUOTE
pt states he was seen at [facility] d/t urinary retention pt was sent home with Reinoso catheter pt pulled out Reinoso catheter and states there was a lot of blood, pulled it out as soon as he went home. pt states "he pulled it out because I couldn't stand it" pt states there was a lot of blood.  pt states it hurts when he urinates but he is making urine.

## 2022-02-13 NOTE — ED PROVIDER NOTE - PHYSICAL EXAMINATION
Constitutional - well-developed. Head - NCAT. Airway patent. Eyes - PERRL. CV - RRR. no murmur. no edema. Pulm - CTAB. Abd - soft, nt. no rebound. no guarding. Neuro - A&Ox3. strength 5/5 x4. sensation intact x4. normal gait. Skin - No rash. MSK - normal ROM.     gu - small blood at the urethral meatus.

## 2022-02-13 NOTE — ED PROVIDER NOTE - OBJECTIVE STATEMENT
Pt is an 81 yo M co bleeding from the his penis. Pt went to [facility] 3 d ago and was found to have urinary retention and had a giang placed. PT states that the giang was uncomfortable and he did not want it in so he ripped it out last night and had a lot of blood. PT states that he has had blood coming from his penis since then. Pt states that he is able to void but just wanted to get checked out.

## 2022-02-13 NOTE — ED ADULT NURSE NOTE - OBJECTIVE STATEMENT
pt reports he had a giang placed and then he got tired of it, so he ripped it out. bleeding noted. passing clots. AOX3, no other complaints.

## 2022-02-13 NOTE — ED ADULT NURSE NOTE - NSICDXPASTSURGICALHX_GEN_ALL_CORE_FT
PAST SURGICAL HISTORY:  Abdominal fistula     Gastric perforation     H/O bilateral inguinal hernia repair

## 2022-02-13 NOTE — ED PROVIDER NOTE - CLINICAL SUMMARY MEDICAL DECISION MAKING FREE TEXT BOX
Pt with penile trauma from self-removal of giang.  will check UA and post void US to see if he is retaining urine.

## 2022-02-13 NOTE — ED PROVIDER NOTE - PATIENT PORTAL LINK FT
You can access the FollowMyHealth Patient Portal offered by [facility] by registering at the following website: [email or web address]. By joining [employer]’s FollowMyHealth portal, you will also be able to view your health information using other applications (apps) compatible with our system.

## 2022-02-13 NOTE — ED ADULT NURSE NOTE - CHIEF COMPLAINT QUOTE
pt states he was seen at [facility] d/t urinary retention pt was sent home with Reinoso catheter pt pulled out Reinoso catheter and states there was a lot of blood, pulled it out as soon as he went home. pt states "he pulled it out because I couldn't stand it" pt states there was a lot of blood.  pt states it hurts when he urinates but he is making urine.

## 2022-02-14 ENCOUNTER — INPATIENT (INPATIENT)
Facility: HOSPITAL | Age: 82
LOS: 3 days | Discharge: HOME | DRG: 689 | End: 2022-02-18
Attending: STUDENT IN AN ORGANIZED HEALTH CARE EDUCATION/TRAINING PROGRAM | Admitting: HOSPITALIST
Payer: MEDICARE

## 2022-02-14 VITALS
HEART RATE: 105 BPM | HEIGHT: 67 IN | DIASTOLIC BLOOD PRESSURE: 91 MMHG | TEMPERATURE: 98 F | RESPIRATION RATE: 20 BRPM | OXYGEN SATURATION: 99 % | SYSTOLIC BLOOD PRESSURE: 158 MMHG

## 2022-02-14 DIAGNOSIS — N39.0 URINARY TRACT INFECTION, SITE NOT SPECIFIED: ICD-10-CM

## 2022-02-14 DIAGNOSIS — K63.2 FISTULA OF INTESTINE: Chronic | ICD-10-CM

## 2022-02-14 DIAGNOSIS — K25.5 CHRONIC OR UNSPECIFIED GASTRIC ULCER WITH PERFORATION: Chronic | ICD-10-CM

## 2022-02-14 DIAGNOSIS — Z98.890 OTHER SPECIFIED POSTPROCEDURAL STATES: Chronic | ICD-10-CM

## 2022-02-14 LAB
ALBUMIN SERPL ELPH-MCNC: 4.3 G/DL — SIGNIFICANT CHANGE UP (ref 3.3–5.2)
ALP SERPL-CCNC: 53 U/L — SIGNIFICANT CHANGE UP (ref 40–120)
ALT FLD-CCNC: 9 U/L — SIGNIFICANT CHANGE UP
AMPHET UR-MCNC: NEGATIVE — SIGNIFICANT CHANGE UP
ANION GAP SERPL CALC-SCNC: 12 MMOL/L — SIGNIFICANT CHANGE UP (ref 5–17)
APAP SERPL-MCNC: <3 UG/ML — LOW (ref 10–26)
APPEARANCE UR: ABNORMAL
AST SERPL-CCNC: 15 U/L — SIGNIFICANT CHANGE UP
BACTERIA # UR AUTO: ABNORMAL
BARBITURATES UR SCN-MCNC: NEGATIVE — SIGNIFICANT CHANGE UP
BASOPHILS # BLD AUTO: 0.05 K/UL — SIGNIFICANT CHANGE UP (ref 0–0.2)
BASOPHILS NFR BLD AUTO: 0.5 % — SIGNIFICANT CHANGE UP (ref 0–2)
BENZODIAZ UR-MCNC: POSITIVE
BILIRUB SERPL-MCNC: 0.6 MG/DL — SIGNIFICANT CHANGE UP (ref 0.4–2)
BILIRUB UR-MCNC: ABNORMAL
BUN SERPL-MCNC: 23 MG/DL — HIGH (ref 8–20)
CALCIUM SERPL-MCNC: 9.7 MG/DL — SIGNIFICANT CHANGE UP (ref 8.6–10.2)
CHLORIDE SERPL-SCNC: 99 MMOL/L — SIGNIFICANT CHANGE UP (ref 98–107)
CO2 SERPL-SCNC: 26 MMOL/L — SIGNIFICANT CHANGE UP (ref 22–29)
COCAINE METAB.OTHER UR-MCNC: NEGATIVE — SIGNIFICANT CHANGE UP
COD CRY URNS QL: ABNORMAL
COLOR SPEC: ABNORMAL
CREAT SERPL-MCNC: 1.03 MG/DL — SIGNIFICANT CHANGE UP (ref 0.5–1.3)
DIFF PNL FLD: ABNORMAL
EOSINOPHIL # BLD AUTO: 0.07 K/UL — SIGNIFICANT CHANGE UP (ref 0–0.5)
EOSINOPHIL NFR BLD AUTO: 0.6 % — SIGNIFICANT CHANGE UP (ref 0–6)
EPI CELLS # UR: SIGNIFICANT CHANGE UP
ETHANOL SERPL-MCNC: <10 MG/DL — SIGNIFICANT CHANGE UP (ref 0–9)
FLUAV AG NPH QL: SIGNIFICANT CHANGE UP
FLUBV AG NPH QL: SIGNIFICANT CHANGE UP
GLUCOSE SERPL-MCNC: 99 MG/DL — SIGNIFICANT CHANGE UP (ref 70–99)
GLUCOSE UR QL: NEGATIVE — SIGNIFICANT CHANGE UP
GRAN CASTS # UR COMP ASSIST: ABNORMAL /LPF
HCT VFR BLD CALC: 37.3 % — LOW (ref 39–50)
HGB BLD-MCNC: 13.1 G/DL — SIGNIFICANT CHANGE UP (ref 13–17)
IMM GRANULOCYTES NFR BLD AUTO: 0.4 % — SIGNIFICANT CHANGE UP (ref 0–1.5)
KETONES UR-MCNC: ABNORMAL
LEUKOCYTE ESTERASE UR-ACNC: ABNORMAL
LYMPHOCYTES # BLD AUTO: 19.5 % — SIGNIFICANT CHANGE UP (ref 13–44)
LYMPHOCYTES # BLD AUTO: 2.12 K/UL — SIGNIFICANT CHANGE UP (ref 1–3.3)
MCHC RBC-ENTMCNC: 30.9 PG — SIGNIFICANT CHANGE UP (ref 27–34)
MCHC RBC-ENTMCNC: 35.1 GM/DL — SIGNIFICANT CHANGE UP (ref 32–36)
MCV RBC AUTO: 88 FL — SIGNIFICANT CHANGE UP (ref 80–100)
METHADONE UR-MCNC: NEGATIVE — SIGNIFICANT CHANGE UP
MONOCYTES # BLD AUTO: 1.1 K/UL — HIGH (ref 0–0.9)
MONOCYTES NFR BLD AUTO: 10.1 % — SIGNIFICANT CHANGE UP (ref 2–14)
NEUTROPHILS # BLD AUTO: 7.5 K/UL — HIGH (ref 1.8–7.4)
NEUTROPHILS NFR BLD AUTO: 68.9 % — SIGNIFICANT CHANGE UP (ref 43–77)
NITRITE UR-MCNC: POSITIVE
OPIATES UR-MCNC: NEGATIVE — SIGNIFICANT CHANGE UP
PCP SPEC-MCNC: SIGNIFICANT CHANGE UP
PCP UR-MCNC: NEGATIVE — SIGNIFICANT CHANGE UP
PH UR: 6 — SIGNIFICANT CHANGE UP (ref 5–8)
PLATELET # BLD AUTO: 296 K/UL — SIGNIFICANT CHANGE UP (ref 150–400)
POTASSIUM SERPL-MCNC: 4.5 MMOL/L — SIGNIFICANT CHANGE UP (ref 3.5–5.3)
POTASSIUM SERPL-SCNC: 4.5 MMOL/L — SIGNIFICANT CHANGE UP (ref 3.5–5.3)
PROT SERPL-MCNC: 6.7 G/DL — SIGNIFICANT CHANGE UP (ref 6.6–8.7)
PROT UR-MCNC: 100
RBC # BLD: 4.24 M/UL — SIGNIFICANT CHANGE UP (ref 4.2–5.8)
RBC # FLD: 13.3 % — SIGNIFICANT CHANGE UP (ref 10.3–14.5)
RBC CASTS # UR COMP ASSIST: >50 /HPF (ref 0–4)
RSV RNA NPH QL NAA+NON-PROBE: SIGNIFICANT CHANGE UP
SALICYLATES SERPL-MCNC: <0.6 MG/DL — LOW (ref 10–20)
SARS-COV-2 RNA SPEC QL NAA+PROBE: SIGNIFICANT CHANGE UP
SODIUM SERPL-SCNC: 137 MMOL/L — SIGNIFICANT CHANGE UP (ref 135–145)
SP GR SPEC: 1.02 — SIGNIFICANT CHANGE UP (ref 1.01–1.02)
THC UR QL: NEGATIVE — SIGNIFICANT CHANGE UP
UROBILINOGEN FLD QL: 1
WBC # BLD: 10.88 K/UL — HIGH (ref 3.8–10.5)
WBC # FLD AUTO: 10.88 K/UL — HIGH (ref 3.8–10.5)
WBC UR QL: ABNORMAL /HPF (ref 0–5)

## 2022-02-14 PROCEDURE — 99223 1ST HOSP IP/OBS HIGH 75: CPT

## 2022-02-14 PROCEDURE — 71045 X-RAY EXAM CHEST 1 VIEW: CPT | Mod: 26

## 2022-02-14 PROCEDURE — 72125 CT NECK SPINE W/O DYE: CPT | Mod: 26,MA

## 2022-02-14 PROCEDURE — 93010 ELECTROCARDIOGRAM REPORT: CPT

## 2022-02-14 PROCEDURE — 70450 CT HEAD/BRAIN W/O DYE: CPT | Mod: 26,MA

## 2022-02-14 PROCEDURE — 99285 EMERGENCY DEPT VISIT HI MDM: CPT | Mod: GC

## 2022-02-14 RX ORDER — TAMSULOSIN HYDROCHLORIDE 0.4 MG/1
0.4 CAPSULE ORAL AT BEDTIME
Refills: 0 | Status: DISCONTINUED | OUTPATIENT
Start: 2022-02-14 | End: 2022-02-18

## 2022-02-14 RX ORDER — ENOXAPARIN SODIUM 100 MG/ML
40 INJECTION SUBCUTANEOUS DAILY
Refills: 0 | Status: DISCONTINUED | OUTPATIENT
Start: 2022-02-14 | End: 2022-02-18

## 2022-02-14 RX ORDER — PHENAZOPYRIDINE HCL 100 MG
100 TABLET ORAL EVERY 8 HOURS
Refills: 0 | Status: COMPLETED | OUTPATIENT
Start: 2022-02-14 | End: 2022-02-16

## 2022-02-14 RX ORDER — CEFTRIAXONE 500 MG/1
1000 INJECTION, POWDER, FOR SOLUTION INTRAMUSCULAR; INTRAVENOUS EVERY 24 HOURS
Refills: 0 | Status: DISCONTINUED | OUTPATIENT
Start: 2022-02-14 | End: 2022-02-14

## 2022-02-14 RX ORDER — CEFTRIAXONE 500 MG/1
1000 INJECTION, POWDER, FOR SOLUTION INTRAMUSCULAR; INTRAVENOUS EVERY 24 HOURS
Refills: 0 | Status: DISCONTINUED | OUTPATIENT
Start: 2022-02-15 | End: 2022-02-18

## 2022-02-14 RX ORDER — CEFTRIAXONE 500 MG/1
1000 INJECTION, POWDER, FOR SOLUTION INTRAMUSCULAR; INTRAVENOUS ONCE
Refills: 0 | Status: COMPLETED | OUTPATIENT
Start: 2022-02-14 | End: 2022-02-14

## 2022-02-14 RX ADMIN — CEFTRIAXONE 100 MILLIGRAM(S): 500 INJECTION, POWDER, FOR SOLUTION INTRAMUSCULAR; INTRAVENOUS at 14:00

## 2022-02-14 RX ADMIN — Medication 100 MILLIGRAM(S): at 22:53

## 2022-02-14 RX ADMIN — TAMSULOSIN HYDROCHLORIDE 0.4 MILLIGRAM(S): 0.4 CAPSULE ORAL at 22:53

## 2022-02-14 NOTE — ED ADULT NURSE NOTE - NSIMPLEMENTINTERV_GEN_ALL_ED
Implemented All Fall Risk Interventions:  [place] to call system. Call bell, personal items and telephone within reach. Instruct patient to call for assistance. Room bathroom lighting operational. Non-slip footwear when patient is off stretcher. Physically safe environment: no spills, clutter or unnecessary equipment. Stretcher in lowest position, wheels locked, appropriate side rails in place. Provide visual cue, wrist band, yellow gown, etc. Monitor gait and stability. Monitor for mental status changes and reorient to person, place, and time. Review medications for side effects contributing to fall risk. Reinforce activity limits and safety measures with patient and family.
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
## 2022-02-14 NOTE — H&P ADULT - NSHPREVIEWOFSYSTEMS_GEN_ALL_CORE
forgetful, intermittent confusion  pain with urination , retention, depressed   suicidal attempt in the past
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
## 2022-02-14 NOTE — PATIENT PROFILE ADULT - FALL HARM RISK - RISK INTERVENTIONS

## 2022-02-14 NOTE — ED PROVIDER NOTE - PHYSICAL EXAMINATION
Gen: Well appearing in NAD  Head: NC/AT  Neck: trachea midline  Resp:  No distress, lungs cta b/l  Cardiac: Heart RRR, no murmur.   Ext: no deformities  Neuro:  A&O appears non focal  Skin:  Warm and dry as visualized  Psych:  +SI.

## 2022-02-14 NOTE — ED PROVIDER NOTE - CLINICAL SUMMARY MEDICAL DECISION MAKING FREE TEXT BOX
81 yo male presents from home with confusion. He endorses SI. Will perform AMS workup. Check CT head. Tox levels. Basic labs. Check urine and urine culture. 1:1 in place. Monitor and reassess.
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
## 2022-02-14 NOTE — H&P ADULT - ASSESSMENT
81 yo M with no significant Pmhx who does not see doctor , active smoker , depressed presented to the ED with confusion , pain with urination. admitted for suspected UTI, depression , suicidal ideations   Pt is with recent giang insertion , pulled at home by him visited ED 2/13 , reinserted and discharged home ,  pt lives alone , not clear he fell at home , depressed needs  evaluation safe discharge   recently had giang insertion due to retention , possible UTI , depressed ,suicidal ideation     1- Urinary retention pain with urination   suspected UTI   urine cx ordered   started empirical iv rocephin   add pyridium for pain   flomax 0.4 mg ordered     2- Depression / suicidal ideation    consult called   1:1   has benzo in drug screen , he does not remember his meds     3- Elevated BP ,   he denies any medical hx   monitor and start BP meds as needed     4- Confusion / forgetful   may have underlying dementia ?   check vit b1 2. folate , vit d levels   monitor treat infection   fall precautions   SW , PT evaluation   need safe discharge planning     DVt prophylaxis lovenox   Pt ambulate     discharge planning issues , SW  referal    consult pending
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
## 2022-02-14 NOTE — ED ADULT TRIAGE NOTE - CHIEF COMPLAINT QUOTE
Patient BIBA to ED today from home with c/o one week of increased confusion, patient was seen in ED yesterday for the same.  Patient lives in home with an apartment up stairs.  People whom he lives with called EMS due to confusion. Patient BIBA to ED today from home with c/o one week of increased confusion, patient was seen in ED yesterday for the same.  Patient lives in home with an apartment up stairs.  People whom he lives with called EMS due to confusion.  Patient has no known family that he lives with.  Patient apparently drove into [place] yesterday and was found confused and brought to here to hospital yesterday after the episode.

## 2022-02-14 NOTE — ED ADULT NURSE NOTE - CHIEF COMPLAINT QUOTE
Patient BIBA to ED today from home with c/o one week of increased confusion, patient was seen in ED yesterday for the same.  Patient lives in home with an apartment up stairs.  People whom he lives with called EMS due to confusion.  Patient has no known family that he lives with.  Patient apparently drove into [place] yesterday and was found confused and brought to here to hospital yesterday after the episode.

## 2022-02-14 NOTE — H&P ADULT - NSHPPHYSICALEXAM_GEN_ALL_CORE
Vital Signs Last 24 Hrs  T(C): 36.7 (14 Feb 2022 10:43), Max: 36.7 (14 Feb 2022 10:43)  T(F): 98 (14 Feb 2022 10:43), Max: 98 (14 Feb 2022 10:43)  HR: 105 (14 Feb 2022 10:43) (105 - 105)  BP: 158/91 (14 Feb 2022 10:43) (158/91 - 158/91)  BP(mean): --  RR: 20 (14 Feb 2022 10:43) (20 - 20)  SpO2: 99% (14 Feb 2022 10:43) (99% - 99%)

## 2022-02-14 NOTE — H&P ADULT - NSHPLABSRESULTS_GEN_ALL_CORE
13.1   10.88 )-----------( 296      ( 14 Feb 2022 11:52 )               37.3   02-14    137  |  99  |  23.0<H>  ----------------------------<  99  4.5   |  26.0  |  1.03    Ca    9.7      14 Feb 2022 11:52    TPro  6.7  /  Alb  4.3  /  TBili  0.6  /  DBili  x   /  AST  15  /  ALT  9   /  AlkPhos  53  02-14

## 2022-02-15 LAB
24R-OH-CALCIDIOL SERPL-MCNC: 56.6 NG/ML — SIGNIFICANT CHANGE UP (ref 30–80)
ANION GAP SERPL CALC-SCNC: 11 MMOL/L — SIGNIFICANT CHANGE UP (ref 5–17)
BUN SERPL-MCNC: 18.4 MG/DL — SIGNIFICANT CHANGE UP (ref 8–20)
CALCIUM SERPL-MCNC: 9 MG/DL — SIGNIFICANT CHANGE UP (ref 8.6–10.2)
CHLORIDE SERPL-SCNC: 102 MMOL/L — SIGNIFICANT CHANGE UP (ref 98–107)
CO2 SERPL-SCNC: 26 MMOL/L — SIGNIFICANT CHANGE UP (ref 22–29)
CREAT SERPL-MCNC: 0.71 MG/DL — SIGNIFICANT CHANGE UP (ref 0.5–1.3)
CULTURE RESULTS: NO GROWTH — SIGNIFICANT CHANGE UP
GLUCOSE SERPL-MCNC: 91 MG/DL — SIGNIFICANT CHANGE UP (ref 70–99)
MAGNESIUM SERPL-MCNC: 1.7 MG/DL — SIGNIFICANT CHANGE UP (ref 1.6–2.6)
PHOSPHATE SERPL-MCNC: 2.9 MG/DL — SIGNIFICANT CHANGE UP (ref 2.4–4.7)
POTASSIUM SERPL-MCNC: 3.6 MMOL/L — SIGNIFICANT CHANGE UP (ref 3.5–5.3)
POTASSIUM SERPL-SCNC: 3.6 MMOL/L — SIGNIFICANT CHANGE UP (ref 3.5–5.3)
PROCALCITONIN SERPL-MCNC: 0.04 NG/ML — SIGNIFICANT CHANGE UP (ref 0.02–0.1)
SODIUM SERPL-SCNC: 139 MMOL/L — SIGNIFICANT CHANGE UP (ref 135–145)
SPECIMEN SOURCE: SIGNIFICANT CHANGE UP
VIT B12 SERPL-MCNC: >2000 PG/ML — HIGH (ref 232–1245)

## 2022-02-15 PROCEDURE — 99233 SBSQ HOSP IP/OBS HIGH 50: CPT

## 2022-02-15 RX ORDER — ACETAMINOPHEN 500 MG
650 TABLET ORAL EVERY 6 HOURS
Refills: 0 | Status: DISCONTINUED | OUTPATIENT
Start: 2022-02-15 | End: 2022-02-18

## 2022-02-15 RX ORDER — NICOTINE POLACRILEX 2 MG
1 GUM BUCCAL DAILY
Refills: 0 | Status: DISCONTINUED | OUTPATIENT
Start: 2022-02-15 | End: 2022-02-18

## 2022-02-15 RX ADMIN — Medication 1 PATCH: at 15:48

## 2022-02-15 RX ADMIN — Medication 100 MILLIGRAM(S): at 15:48

## 2022-02-15 RX ADMIN — Medication 1 PATCH: at 18:04

## 2022-02-15 RX ADMIN — Medication 100 MILLIGRAM(S): at 05:50

## 2022-02-15 RX ADMIN — TAMSULOSIN HYDROCHLORIDE 0.4 MILLIGRAM(S): 0.4 CAPSULE ORAL at 22:15

## 2022-02-15 RX ADMIN — ENOXAPARIN SODIUM 40 MILLIGRAM(S): 100 INJECTION SUBCUTANEOUS at 14:12

## 2022-02-15 RX ADMIN — CEFTRIAXONE 100 MILLIGRAM(S): 500 INJECTION, POWDER, FOR SOLUTION INTRAMUSCULAR; INTRAVENOUS at 16:21

## 2022-02-15 RX ADMIN — Medication 100 MILLIGRAM(S): at 22:15

## 2022-02-15 NOTE — CHART NOTE - NSCHARTNOTEFT_GEN_A_CORE
Asked by RN for giang order while doing rounds.  Pt came in w/ urinary retention admitted for UTI;  had giang placed in ED.  Pt w/o fever; on Rocephin.  Giang catheter ordered.

## 2022-02-15 NOTE — PROGRESS NOTE ADULT - ASSESSMENT
81 yo M with no significant Pmhx who does not see doctor , active smoker , depressed presented to the ED with confusion , pain with urination. admitted for suspected UTI, depression , suicidal ideations   Pt is with recent giang insertion , pulled at home by him visited ED 2/13 , reinserted and discharged home ,  pt lives alone , not clear he fell at home , depressed needs  evaluation safe discharge   recently had giang insertion due to retention , possible UTI , depressed ,suicidal ideation     Urinary retention pain with urination   - suspected UTI   - UCx pending  - Continue Rocephin  - Pyridium for pain   - Continue Giang which was inserted in the ED prior to admission.   - Continue Flomax 0.4 mg ordered     Depression / suicidal ideation   -  consult called   - 1:1   - Has benzo in drug screen , he does not remember his meds. iSTOP reviewed - no prescriptions.    Elevated BP   - Now normotensive    Confusion / forgetful   - may have underlying dementia ? Per patients close family friend and landlord Malik, patient has been increasingly forgetful for the past year. He has a son who lives in [place] who he is not in contact wiht.   - Possibly related to UTI  - fall precautions   - SW , PT evaluation   - need safe discharge planning     DVT prophylaxis - lovenox     Pt ambulate     discharge planning issues , SW  referral      consult pending      81 yo M with no significant Pmhx who does not see doctor , active smoker , depressed presented to the ED with confusion , pain with urination. admitted for suspected UTI, depression , suicidal ideations   Pt is with recent giang insertion , pulled at home by him visited ED 2/13 , reinserted and discharged home ,  pt lives alone , not clear he fell at home , depressed needs  evaluation safe discharge   recently had giang insertion due to retention , possible UTI , depressed ,suicidal ideation     Urinary retention pain with urination   - suspected UTI   - UCx pending  - Continue Rocephin  - Pyridium for pain   - Continue Giang which was inserted in the ED prior to admission.   - Continue Flomax 0.4 mg ordered     Depression / suicidal ideation   -  consult called   - 1:1   - Has benzo in drug screen , he does not remember his meds. iSTOP reviewed - no prescriptions.    Elevated BP   - Now normotensive    Confusion / forgetful   - may have underlying dementia ? Per patients close family friend and landlord Malik, patient has been increasingly forgetful for the past year. He has a son who lives in [place] who he is not in contact wiht.   - Possibly related to UTI  - fall precautions   - SW , PT evaluation   - need safe discharge planning     DVT prophylaxis - lovenox     Pt ambulate     discharge planning issues , SW  referral      consult pending     Discussed with patient's landlord Malik. He states he is close with the patient and that the patient refers to him as his nephew. He states that the patient has a son who lives in [place] that he is not in touch with. Otherwise, he has no other family members.

## 2022-02-16 LAB
ALBUMIN SERPL ELPH-MCNC: 3.4 G/DL — SIGNIFICANT CHANGE UP (ref 3.3–5.2)
ALP SERPL-CCNC: 47 U/L — SIGNIFICANT CHANGE UP (ref 40–120)
ALT FLD-CCNC: 7 U/L — SIGNIFICANT CHANGE UP
ANION GAP SERPL CALC-SCNC: 11 MMOL/L — SIGNIFICANT CHANGE UP (ref 5–17)
AST SERPL-CCNC: 9 U/L — SIGNIFICANT CHANGE UP
BILIRUB SERPL-MCNC: 0.4 MG/DL — SIGNIFICANT CHANGE UP (ref 0.4–2)
BUN SERPL-MCNC: 9.8 MG/DL — SIGNIFICANT CHANGE UP (ref 8–20)
CALCIUM SERPL-MCNC: 8.7 MG/DL — SIGNIFICANT CHANGE UP (ref 8.6–10.2)
CHLORIDE SERPL-SCNC: 102 MMOL/L — SIGNIFICANT CHANGE UP (ref 98–107)
CO2 SERPL-SCNC: 25 MMOL/L — SIGNIFICANT CHANGE UP (ref 22–29)
CREAT SERPL-MCNC: 0.69 MG/DL — SIGNIFICANT CHANGE UP (ref 0.5–1.3)
GLUCOSE SERPL-MCNC: 97 MG/DL — SIGNIFICANT CHANGE UP (ref 70–99)
HCT VFR BLD CALC: 32.6 % — LOW (ref 39–50)
HGB BLD-MCNC: 11.6 G/DL — LOW (ref 13–17)
MCHC RBC-ENTMCNC: 30.9 PG — SIGNIFICANT CHANGE UP (ref 27–34)
MCHC RBC-ENTMCNC: 35.6 GM/DL — SIGNIFICANT CHANGE UP (ref 32–36)
MCV RBC AUTO: 86.7 FL — SIGNIFICANT CHANGE UP (ref 80–100)
PLATELET # BLD AUTO: 247 K/UL — SIGNIFICANT CHANGE UP (ref 150–400)
POTASSIUM SERPL-MCNC: 3.7 MMOL/L — SIGNIFICANT CHANGE UP (ref 3.5–5.3)
POTASSIUM SERPL-SCNC: 3.7 MMOL/L — SIGNIFICANT CHANGE UP (ref 3.5–5.3)
PROCALCITONIN SERPL-MCNC: 0.04 NG/ML — SIGNIFICANT CHANGE UP (ref 0.02–0.1)
PROT SERPL-MCNC: 5.6 G/DL — LOW (ref 6.6–8.7)
RBC # BLD: 3.76 M/UL — LOW (ref 4.2–5.8)
RBC # FLD: 12.9 % — SIGNIFICANT CHANGE UP (ref 10.3–14.5)
SODIUM SERPL-SCNC: 138 MMOL/L — SIGNIFICANT CHANGE UP (ref 135–145)
WBC # BLD: 6.62 K/UL — SIGNIFICANT CHANGE UP (ref 3.8–10.5)
WBC # FLD AUTO: 6.62 K/UL — SIGNIFICANT CHANGE UP (ref 3.8–10.5)

## 2022-02-16 PROCEDURE — 99222 1ST HOSP IP/OBS MODERATE 55: CPT

## 2022-02-16 PROCEDURE — 99232 SBSQ HOSP IP/OBS MODERATE 35: CPT

## 2022-02-16 RX ADMIN — Medication 100 MILLIGRAM(S): at 06:39

## 2022-02-16 RX ADMIN — Medication 1 PATCH: at 17:28

## 2022-02-16 RX ADMIN — Medication 1 PATCH: at 11:21

## 2022-02-16 RX ADMIN — CEFTRIAXONE 100 MILLIGRAM(S): 500 INJECTION, POWDER, FOR SOLUTION INTRAMUSCULAR; INTRAVENOUS at 16:02

## 2022-02-16 RX ADMIN — ENOXAPARIN SODIUM 40 MILLIGRAM(S): 100 INJECTION SUBCUTANEOUS at 11:22

## 2022-02-16 RX ADMIN — Medication 1 PATCH: at 06:41

## 2022-02-16 RX ADMIN — TAMSULOSIN HYDROCHLORIDE 0.4 MILLIGRAM(S): 0.4 CAPSULE ORAL at 22:03

## 2022-02-16 RX ADMIN — Medication 1 PATCH: at 11:19

## 2022-02-16 RX ADMIN — Medication 100 MILLIGRAM(S): at 13:37

## 2022-02-16 NOTE — BH CONSULTATION LIAISON ASSESSMENT NOTE - CURRENT MEDICATION
MEDICATIONS  (STANDING):  cefTRIAXone   IVPB 1000 milliGRAM(s) IV Intermittent every 24 hours  enoxaparin Injectable 40 milliGRAM(s) SubCutaneous daily  nicotine - 21 mG/24Hr(s) Patch 1 patch Transdermal daily  tamsulosin 0.4 milliGRAM(s) Oral at bedtime    MEDICATIONS  (PRN):  acetaminophen     Tablet .. 650 milliGRAM(s) Oral every 6 hours PRN Mild Pain (1 - 3)

## 2022-02-16 NOTE — PROGRESS NOTE ADULT - ASSESSMENT
81 yo M with no significant Pmhx who does not see doctor , active smoker , depressed presented to the ED with confusion , pain with urination. admitted for suspected UTI, depression , suicidal ideations   Pt is with recent giang insertion , pulled at home by him visited ED 2/13 , reinserted and discharged home ,  pt lives alone , not clear he fell at home , depressed needs  evaluation safe discharge   recently had giang insertion due to retention , possible UTI , depressed ,suicidal ideation     Urinary retention pain with urination   - suspected UTI   - UCx NGTD  - Continue Rocephin  - Pyridium for pain   - Continue Giang which was inserted in the ED prior to admission.   - Continue Flomax 0.4 mg ordered     Depression / suicidal ideation   -  consult called   - 1:1   - Has benzo in drug screen , he does not remember his meds. iSTOP reviewed - no prescriptions.    Elevated BP   - Now normotensive    Confusion / forgetful   - may have underlying dementia ? Per patients close family friend and landlord Malik, patient has been increasingly forgetful for the past year. He has a son who lives in [place] who he is not in contact wiht.   - Possibly related to UTI  - fall precautions   - SW , PT evaluation   - need safe discharge planning     DVT prophylaxis - lovenox     PT consult    discharge planning issues ,   referral      consult pending     12/5 - Discussed with patient's landlord Malik. He states he is close with the patient and that the patient refers to him as his nephew. He states that the patient has a son who lives in [place] that he is not in touch with. Otherwise, he has no other family members.

## 2022-02-16 NOTE — BH CONSULTATION LIAISON ASSESSMENT NOTE - SUMMARY
Patient is a 82 year old  male who is retired, living in a rented room, with no past psychiatric history who was brought in for worsening confusion and admitted for likely UTI.     Patient seen and currently calm, cooperative, oriented x3 and denying any s/h ideation and with no signs of alphonse or psychosis. Patient initially presented more confused and now appears significantly improved. Likely resolving delirium. Can not rule out underlying neurocognitive disorder.     Recs.   Resolving delirium   Keep on precautions  Frequent re orientation, PT for early ambulation, hydration   Avoid anticholinergic meds, benzod and opioids   Recommend outpatient neuro follow up and would benefit from outpatient neurocognitive testing

## 2022-02-16 NOTE — BH CONSULTATION LIAISON ASSESSMENT NOTE - HPI (INCLUDE ILLNESS QUALITY, SEVERITY, DURATION, TIMING, CONTEXT, MODIFYING FACTORS, ASSOCIATED SIGNS AND SYMPTOMS)
Patient is a 82 year old  male who is retired, living in a rented room, with no past psychiatric history who was brought in for worsening confusion and admitted for likely UTI.     Patient seen and evaluated and found to be calm, cooperative and pleasant. patient oriented x3 and states he is feeling better and asking when he can go back home. Patient states he is not sure what happened but gives story stating we had several days of being very confused, ended [place] getting [place], ended up in a hospital who transported him home. Patient states nothing like this has ever happened before, was confused for several days but is doing better. Patient also reports good sleep and good appetite denying any s/h ideation. when asked about suicidality patient states "im 82, why would I do that at this point in my life, I have a good life". Patient did admit to when he was lost to making a statement that he should "end it" but states it was out of frustration because he was confused and didn't know what to do but denies any actual thought to hurt self. Patient further denies any symptoms of alphonse or AVH.

## 2022-02-16 NOTE — BH CONSULTATION LIAISON ASSESSMENT NOTE - NSBHCHARTREVIEWVS_PSY_A_CORE FT
Vital Signs Last 24 Hrs  T(C): 36.6 (16 Feb 2022 10:02), Max: 37.1 (15 Feb 2022 17:08)  T(F): 97.8 (16 Feb 2022 10:02), Max: 98.8 (15 Feb 2022 17:08)  HR: 49 (16 Feb 2022 10:02) (49 - 85)  BP: 198/68 (16 Feb 2022 10:02) (130/67 - 198/68)  BP(mean): --  RR: 16 (16 Feb 2022 10:02) (16 - 18)  SpO2: 94% (16 Feb 2022 10:02) (91% - 94%)

## 2022-02-16 NOTE — BH CONSULTATION LIAISON ASSESSMENT NOTE - RISK ASSESSMENT
RF: AGe, gender,   PF: denies s/h ideation, no past suicide attempts, no h/o subs abuse, accepting treatment and reports family as protective factor

## 2022-02-17 ENCOUNTER — TRANSCRIPTION ENCOUNTER (OUTPATIENT)
Age: 82
End: 2022-02-17

## 2022-02-17 PROCEDURE — 93005 ELECTROCARDIOGRAM TRACING: CPT

## 2022-02-17 PROCEDURE — 81001 URINALYSIS AUTO W/SCOPE: CPT

## 2022-02-17 PROCEDURE — 80307 DRUG TEST PRSMV CHEM ANLYZR: CPT

## 2022-02-17 PROCEDURE — 80048 BASIC METABOLIC PNL TOTAL CA: CPT

## 2022-02-17 PROCEDURE — 72125 CT NECK SPINE W/O DYE: CPT | Mod: MA

## 2022-02-17 PROCEDURE — 84443 ASSAY THYROID STIM HORMONE: CPT

## 2022-02-17 PROCEDURE — 82306 VITAMIN D 25 HYDROXY: CPT

## 2022-02-17 PROCEDURE — 83735 ASSAY OF MAGNESIUM: CPT

## 2022-02-17 PROCEDURE — 87086 URINE CULTURE/COLONY COUNT: CPT

## 2022-02-17 PROCEDURE — 36415 COLL VENOUS BLD VENIPUNCTURE: CPT

## 2022-02-17 PROCEDURE — 87637 SARSCOV2&INF A&B&RSV AMP PRB: CPT

## 2022-02-17 PROCEDURE — 99232 SBSQ HOSP IP/OBS MODERATE 35: CPT

## 2022-02-17 PROCEDURE — 85025 COMPLETE CBC W/AUTO DIFF WBC: CPT

## 2022-02-17 PROCEDURE — 85027 COMPLETE CBC AUTOMATED: CPT

## 2022-02-17 PROCEDURE — 99285 EMERGENCY DEPT VISIT HI MDM: CPT | Mod: 25

## 2022-02-17 PROCEDURE — 97163 PT EVAL HIGH COMPLEX 45 MIN: CPT

## 2022-02-17 PROCEDURE — 70450 CT HEAD/BRAIN W/O DYE: CPT | Mod: MA

## 2022-02-17 PROCEDURE — 99283 EMERGENCY DEPT VISIT LOW MDM: CPT

## 2022-02-17 PROCEDURE — 80053 COMPREHEN METABOLIC PANEL: CPT

## 2022-02-17 PROCEDURE — 82607 VITAMIN B-12: CPT

## 2022-02-17 PROCEDURE — 71045 X-RAY EXAM CHEST 1 VIEW: CPT

## 2022-02-17 PROCEDURE — 84145 PROCALCITONIN (PCT): CPT

## 2022-02-17 PROCEDURE — 51702 INSERT TEMP BLADDER CATH: CPT

## 2022-02-17 PROCEDURE — 84100 ASSAY OF PHOSPHORUS: CPT

## 2022-02-17 RX ORDER — ACETAMINOPHEN 500 MG
2 TABLET ORAL
Qty: 0 | Refills: 0 | DISCHARGE
Start: 2022-02-17

## 2022-02-17 RX ORDER — TAMSULOSIN HYDROCHLORIDE 0.4 MG/1
1 CAPSULE ORAL
Qty: 0 | Refills: 0 | DISCHARGE
Start: 2022-02-17

## 2022-02-17 RX ADMIN — Medication 1 PATCH: at 11:05

## 2022-02-17 RX ADMIN — TAMSULOSIN HYDROCHLORIDE 0.4 MILLIGRAM(S): 0.4 CAPSULE ORAL at 21:44

## 2022-02-17 RX ADMIN — CEFTRIAXONE 100 MILLIGRAM(S): 500 INJECTION, POWDER, FOR SOLUTION INTRAMUSCULAR; INTRAVENOUS at 15:43

## 2022-02-17 RX ADMIN — ENOXAPARIN SODIUM 40 MILLIGRAM(S): 100 INJECTION SUBCUTANEOUS at 11:05

## 2022-02-17 NOTE — DISCHARGE NOTE PROVIDER - NSDCMRMEDTOKEN_GEN_ALL_CORE_FT
acetaminophen 325 mg oral tablet: 2 tab(s) orally every 6 hours, As needed, Mild Pain (1 - 3)  tamsulosin 0.4 mg oral capsule: 1 cap(s) orally once a day (at bedtime)   acetaminophen 325 mg oral tablet: 2 tab(s) orally every 6 hours, As needed, Mild Pain (1 - 3)  cefpodoxime 200 mg oral tablet: 1 tab(s) orally 2 times a day   tamsulosin 0.4 mg oral capsule: 1 cap(s) orally once a day (at bedtime)

## 2022-02-17 NOTE — DISCHARGE NOTE PROVIDER - ATTENDING DISCHARGE PHYSICAL EXAMINATION:
Vital Signs Last 24 Hrs  T(F): 98.5 (17 Feb 2022 11:06), Max: 98.5 (17 Feb 2022 11:06)  HR: 76 (17 Feb 2022 11:06) (76 - 81)  BP: 134/68 (17 Feb 2022 11:06) (128/65 - 134/68)  RR: 18 (17 Feb 2022 11:06) (16 - 18)  SpO2: 96% (17 Feb 2022 11:06) (96% - 96%)    Physical Exam:  Constitutional: Appears stated aged, not- chronically ill-appearing, laying comfortably in bed in NAD  HEENT: NC/AT, PERRL, EOMI, trachea midline, no JVD  Respiratory: CTA bilaterally, symmetrical chest rise  Cardiovascular: RRR, no m/g/r  Gastrointestinal: Soft, NT/ND, BS+  Vascular: 2+ peripheral pulses  Neurological: A/O x 3, no focal neurological deficits  Psych: Fair mood/affect  : Reinoso  Musculoskeletal: No edema, cyanosis, deformities. ROM normal  Skin: No obvious rash, lesions. No jaundice.   Vital Signs Last 24 Hrs  T(F): 98.7 (18 Feb 2022 04:51), Max: 98.7 (18 Feb 2022 04:51)  HR: 89 (18 Feb 2022 04:51) (76 - 91)  BP: 108/65 (18 Feb 2022 04:51) (108/65 - 134/70)  RR: 18 (18 Feb 2022 04:51) (18 - 18)  SpO2: 94% (18 Feb 2022 04:51) (94% - 96%)    Physical Exam:  Constitutional: Appears stated aged, not- chronically ill-appearing, laying comfortably in bed in NAD  HEENT: NC/AT, PERRL, EOMI, trachea midline, no JVD  Respiratory: CTA bilaterally, symmetrical chest rise  Cardiovascular: RRR, no m/g/r  Gastrointestinal: Soft, NT/ND, BS+  Vascular: 2+ peripheral pulses  : Reinoso  Neurological: A/O x 3, no focal neurological deficits  Psych: Fair mood/affect  Musculoskeletal: No edema, cyanosis, deformities. ROM normal  Skin: No obvious rash, lesions. No jaundice.

## 2022-02-17 NOTE — DISCHARGE NOTE PROVIDER - CARE PROVIDER_API CALL
Eron Ferreira)  Urology  [address] [place] [address]  Phone: [phone]  Fax: [phone]  Follow Up Time: 1 month   Eron Ferreira)  Urology  [address] [address] [place] [address]  Phone: [phone]  Fax: [phone]  Follow Up Time: 2 weeks

## 2022-02-17 NOTE — PROGRESS NOTE ADULT - ASSESSMENT
83 yo M with no significant Pmhx who does not see doctor , active smoker , depressed presented to the ED with confusion , pain with urination. admitted for suspected UTI, depression , suicidal ideations   Pt is with recent giang insertion , pulled at home by him visited ED 2/13 , reinserted and discharged home ,  pt lives alone , not clear he fell at home , depressed needs  evaluation safe discharge   recently had giang insertion due to retention , possible UTI , depressed ,suicidal ideation     Urinary retention pain with urination   - suspected UTI   - UCx NGTD  - Continue Rocephin  - Pyridium for pain   - Continue Giang which was inserted in the ED prior to admission.   - Continue Flomax 0.4 mg ordered     Depression / suicidal ideation   -  consult appreciated   - Has benzo in drug screen , he does not remember his meds. iSTOP reviewed - no prescriptions.  - Cleared by Psych - no need for 1:1    Elevated BP   - Now normotensive    Confusion / forgetful   - may have underlying dementia ? Per patients close family friend and landlord Malik, patient has been increasingly forgetful for the past year. He has a son who lives in [place] who he is not in contact wiht.   - Possibly related to UTI  - fall precautions   -  , PT evaluation   - Mental status much improved.    DVT prophylaxis - lovenox     discharge planning issues ,   referral     12/5 - Discussed with patient's landlord Malik. He states he is close with the patient and that the patient refers to him as his nephew. He states that the patient has a son who lives in [place] that he is not in touch with. Otherwise, he has no other family members.     Medically ready for discharge, pending PT consult.

## 2022-02-17 NOTE — DISCHARGE NOTE PROVIDER - NSDCCPCAREPLAN_GEN_ALL_CORE_FT
PRINCIPAL DISCHARGE DIAGNOSIS  Diagnosis: Acute UTI  Assessment and Plan of Treatment: Complete course of abx      SECONDARY DISCHARGE DIAGNOSES  Diagnosis: Other depression  Assessment and Plan of Treatment: Outpatient psychiatric follow up as needed    Diagnosis: Urinary retention  Assessment and Plan of Treatment: Continue giang on discharge, outpatient urology follow up

## 2022-02-17 NOTE — PROGRESS NOTE ADULT - SUBJECTIVE AND OBJECTIVE BOX
[facility] Division of Hospital Medicine  Rodrigo Gaston MD    Chief Complaint:  Patient is a 82y old  Male who presents with a chief complaint of UTI (2022 15:53)      SUBJECTIVE / OVERNIGHT EVENTS:  Patient seen and examined at bedside. No acute events reported overnight. Pt confused, wants to go home.     Patient denies chest pain, SOB, abd pain, N/V, fever, chills, dysuria or any other complaints. All remainder ROS negative.     MEDICATIONS  (STANDING):  cefTRIAXone   IVPB 1000 milliGRAM(s) IV Intermittent every 24 hours  enoxaparin Injectable 40 milliGRAM(s) SubCutaneous daily  phenazopyridine 100 milliGRAM(s) Oral every 8 hours  tamsulosin 0.4 milliGRAM(s) Oral at bedtime    MEDICATIONS  (PRN):  acetaminophen     Tablet .. 650 milliGRAM(s) Oral every 6 hours PRN Mild Pain (1 - 3)        I&O's Summary    2022 07:01  -  15 Feb 2022 07:00  --------------------------------------------------------  IN: 0 mL / OUT: 200 mL / NET: -200 mL    15 Feb 2022 07:01  -  15 Feb 2022 10:40  --------------------------------------------------------  IN: 116 mL / OUT: 0 mL / NET: 116 mL        PHYSICAL EXAM:  Vital Signs Last 24 Hrs  T(C): 36.7 (15 Feb 2022 05:15), Max: 36.7 (2022 10:43)  T(F): 98 (15 Feb 2022 05:15), Max: 98.1 (2022 15:48)  HR: 70 (15 Feb 2022 05:15) (70 - 105)  BP: 121/65 (15 Feb 2022 05:15) (121/65 - 158/91)  BP(mean): --  RR: 18 (15 Feb 2022 05:15) (18 - 20)  SpO2: 93% (15 Feb 2022 05:15) (93% - 99%)        CONSTITUTIONAL: Elderly male, NAD  HEENT: NC/AT, PERRL, no JVD  RESPIRATORY: CTA bilaterally, normal effort  CARDIOVASCULAR: RRR, S1/S2+, no m/g/r  ABDOMEN: Nontender to palpation, normoactive bowel sounds, no rebound/guarding; No hepatosplenomegaly  MUSCULOSKELETAL: No edema, cyanosis or deformities.  : Reinoso  PSYCH: A+O to person, place, and time; affect appropriate. Confused.  NEUROLOGY: CN 2-12 are intact and symmetric; no gross neurological deficits.  SKIN: No rashes; no palpable lesions  VASC: Distal pulses palpable    LABS:                        13.1   10.88 )-----------( 296      ( 2022 11:52 )             37.3     02-15    139  |  102  |  18.4  ----------------------------<  91  3.6   |  26.0  |  0.71    Ca    9.0      15 Feb 2022 05:48  Phos  2.9     02-15  Mg     1.7     02-15    TPro  6.7  /  Alb  4.3  /  TBili  0.6  /  DBili  x   /  AST  15  /  ALT  9   /  AlkPhos  53  02-14          Urinalysis Basic - ( 2022 11:52 )    Color: Pari / Appearance: Slightly Turbid / S.020 / pH: x  Gluc: x / Ketone: Small  / Bili: Small / Urobili: 1   Blood: x / Protein: 100 / Nitrite: Positive   Leuk Esterase: Small / RBC: >50 /HPF / WBC 6-10 /HPF   Sq Epi: x / Non Sq Epi: Occasional / Bacteria: Moderate        CAPILLARY BLOOD GLUCOSE            RADIOLOGY & ADDITIONAL TESTS:  Results Reviewed:   Imaging Personally Reviewed:  Electrocardiogram Personally Reviewed:                                          
[facility] Division of Hospital Medicine  Rodrigo Gaston MD    Chief Complaint:  Patient is a 82y old  Male who presents with a chief complaint of UTI (17 Feb 2022 07:07)      SUBJECTIVE / OVERNIGHT EVENTS:  Patient seen and examined at bedside. No acute events reported overnight. No new complaints.    Patient denies chest pain, SOB, abd pain, N/V, fever, chills, dysuria or any other complaints. All remainder ROS negative.     MEDICATIONS  (STANDING):  cefTRIAXone   IVPB 1000 milliGRAM(s) IV Intermittent every 24 hours  enoxaparin Injectable 40 milliGRAM(s) SubCutaneous daily  nicotine - 21 mG/24Hr(s) Patch 1 patch Transdermal daily  tamsulosin 0.4 milliGRAM(s) Oral at bedtime    MEDICATIONS  (PRN):  acetaminophen     Tablet .. 650 milliGRAM(s) Oral every 6 hours PRN Mild Pain (1 - 3)        I&O's Summary    16 Feb 2022 07:01  -  17 Feb 2022 07:00  --------------------------------------------------------  IN: 166 mL / OUT: 820 mL / NET: -654 mL        PHYSICAL EXAM:  Vital Signs Last 24 Hrs  T(C): 36.8 (17 Feb 2022 04:45), Max: 36.8 (16 Feb 2022 16:33)  T(F): 98.3 (17 Feb 2022 04:45), Max: 98.3 (17 Feb 2022 04:45)  HR: 81 (17 Feb 2022 04:45) (49 - 81)  BP: 128/65 (17 Feb 2022 04:45) (128/65 - 198/68)  BP(mean): --  RR: 16 (17 Feb 2022 04:45) (16 - 18)  SpO2: 96% (17 Feb 2022 04:45) (94% - 96%)        CONSTITUTIONAL: NAD  HEENT: NC/AT, PERRL, no JVD  RESPIRATORY: CTA bilaterally, normal effort  CARDIOVASCULAR: RRR, S1/S2+, no m/g/r  ABDOMEN: Nontender to palpation, normoactive bowel sounds, no rebound/guarding; No hepatosplenomegaly  MUSCULOSKELETAL: No edema, cyanosis or deformities.  PSYCH: A+O to person, place, and time; affect appropriate  : No giang  NEUROLOGY: CN 2-12 are intact and symmetric; no gross neurological deficits.  SKIN: No rashes; no palpable lesions  VASC: Distal pulses palpable    LABS:                        11.6   6.62  )-----------( 247      ( 16 Feb 2022 05:27 )             32.6     02-16    138  |  102  |  9.8  ----------------------------<  97  3.7   |  25.0  |  0.69    Ca    8.7      16 Feb 2022 05:27    TPro  5.6<L>  /  Alb  3.4  /  TBili  0.4  /  DBili  x   /  AST  9   /  ALT  7   /  AlkPhos  47  02-16              Culture - Urine (collected 14 Feb 2022 16:18)  Source: Catheterized Catheterized  Final Report (15 Feb 2022 12:54):    No growth      CAPILLARY BLOOD GLUCOSE            RADIOLOGY & ADDITIONAL TESTS:  Results Reviewed:   Imaging Personally Reviewed:  Electrocardiogram Personally Reviewed:                                          
[facility] Division of Hospital Medicine  Rodrigo Gaston MD    Chief Complaint:  Patient is a 82y old  Male who presents with a chief complaint of UTI (15 Feb 2022 10:29)      SUBJECTIVE / OVERNIGHT EVENTS:  Patient seen and examined at bedside. No acute events reported overnight. Less confused today. No complaints.    Patient denies chest pain, SOB, abd pain, N/V, fever, chills, dysuria or any other complaints. All remainder ROS negative.     MEDICATIONS  (STANDING):  cefTRIAXone   IVPB 1000 milliGRAM(s) IV Intermittent every 24 hours  enoxaparin Injectable 40 milliGRAM(s) SubCutaneous daily  nicotine - 21 mG/24Hr(s) Patch 1 patch Transdermal daily  phenazopyridine 100 milliGRAM(s) Oral every 8 hours  tamsulosin 0.4 milliGRAM(s) Oral at bedtime    MEDICATIONS  (PRN):  acetaminophen     Tablet .. 650 milliGRAM(s) Oral every 6 hours PRN Mild Pain (1 - 3)        I&O's Summary    15 Feb 2022 07:01  -  2022 07:00  --------------------------------------------------------  IN: 398 mL / OUT: 750 mL / NET: -352 mL    2022 07:01  -  2022 09:22  --------------------------------------------------------  IN: 116 mL / OUT: 500 mL / NET: -384 mL        PHYSICAL EXAM:  Vital Signs Last 24 Hrs  T(C): 36.9 (2022 06:15), Max: 37.1 (15 Feb 2022 17:08)  T(F): 98.5 (2022 06:15), Max: 98.8 (15 Feb 2022 17:08)  HR: 85 (2022 06:15) (73 - 85)  BP: 130/72 (2022 06:15) (130/67 - 150/85)  BP(mean): --  RR: 18 (2022 06:15) (16 - 18)  SpO2: 91% (2022 06:15) (91% - 95%)        CONSTITUTIONAL: Elderly male, NAD  HEENT: NC/AT, PERRL, no JVD  RESPIRATORY: CTA bilaterally, normal effort  CARDIOVASCULAR: RRR, S1/S2+, no m/g/r  ABDOMEN: Nontender to palpation, normoactive bowel sounds, no rebound/guarding; No hepatosplenomegaly  MUSCULOSKELETAL: No edema, cyanosis or deformities.  PSYCH: A+O to person, place, and time; affect appropriate  : Reinoso  NEUROLOGY: CN 2-12 are intact and symmetric; no gross neurological deficits.  SKIN: No rashes; no palpable lesions  VASC: Distal pulses palpable    LABS:                        11.6   6.62  )-----------( 247      ( 2022 05:27 )             32.6     02-16    138  |  102  |  9.8  ----------------------------<  97  3.7   |  25.0  |  0.69    Ca    8.7      2022 05:27  Phos  2.9     02-15  Mg     1.7     02-15    TPro  5.6<L>  /  Alb  3.4  /  TBili  0.4  /  DBili  x   /  AST  9   /  ALT  7   /  AlkPhos  47  02-16          Urinalysis Basic - ( 2022 11:52 )    Color: Pari / Appearance: Slightly Turbid / S.020 / pH: x  Gluc: x / Ketone: Small  / Bili: Small / Urobili: 1   Blood: x / Protein: 100 / Nitrite: Positive   Leuk Esterase: Small / RBC: >50 /HPF / WBC 6-10 /HPF   Sq Epi: x / Non Sq Epi: Occasional / Bacteria: Moderate        Culture - Urine (collected 2022 16:18)  Source: Catheterized Catheterized  Final Report (15 Feb 2022 12:54):    No growth      CAPILLARY BLOOD GLUCOSE            RADIOLOGY & ADDITIONAL TESTS:  Results Reviewed:   Imaging Personally Reviewed:  Electrocardiogram Personally Reviewed:

## 2022-02-17 NOTE — DISCHARGE NOTE PROVIDER - HOSPITAL COURSE
83 yo M with no significant Pmhx who does not see doctor, active smoker, depressed presented to the ED with confusion, pain with urination. Pt admitted for suspected UTI, urinary retention and suicidal ideations (per pts landlord pt made comments about ending life and reportedly took multiple pills.. also per wilbert, prior suicide attempt in the past when taking an entire bottle of ibuprofen). Reinoso placed in ED. Started on abx however urine cultures not collected. BCx negative. Pt was placed on constant observation for SI until seen by .  evaluated pt, at which that time pt without SI and eventually cleared pt for discharge, recommend outpatient follow up.    81 yo M with no significant Pmhx who does not see doctor, active smoker, depressed presented to the ED with confusion, pain with urination. Pt admitted for suspected UTI, urinary retention and suicidal ideations (per pts landlord pt made comments about ending life and reportedly took multiple pills.. also per wilbert, prior suicide attempt in the past when taking an entire bottle of ibuprofen). Reinoso placed in ED. Started on abx however urine cultures not collected. BCx negative. Pt was placed on constant observation for SI until seen by .  evaluated pt, at which that time pt without SI and eventually cleared pt for discharge, recommend outpatient follow up.     ICU Vital Signs Last 24 Hrs  T(C): 36.8 (17 Feb 2022 04:45), Max: 36.8 (16 Feb 2022 16:33)  T(F): 98.3 (17 Feb 2022 04:45), Max: 98.3 (17 Feb 2022 04:45)  HR: 81 (17 Feb 2022 04:45) (71 - 81)  BP: 128/65 (17 Feb 2022 04:45) (128/65 - 148/68)  BP(mean): --  ABP: --  ABP(mean): --  RR: 16 (17 Feb 2022 04:45) (16 - 18)  SpO2: 96% (17 Feb 2022 04:45) (94% - 96%)   81 yo M with no significant Pmhx who does not see doctor, active smoker, depressed presented to the ED with confusion, pain with urination. Pt admitted for suspected UTI, urinary retention and suicidal ideations (per pts landlord pt made comments about ending life and reportedly took multiple pills.. also per wilbert, prior suicide attempt in the past when taking an entire bottle of ibuprofen). Reinoso placed in ED. Started on abx however urine cultures NGTD. Pt was placed on constant observation for SI until seen by .  evaluated pt, at which that time pt without SI and eventually cleared pt for discharge, recommend outpatient follow up. Patient's mentation improved. Patient treated for acute metabolic encephalopathy secondary to complicated UTI. Started on Ceftriaxone, will be discharged on Vantin. Discharged with indwelling Reinoso, to follow up outpatient with  Urology.     Pt requesting to go home, no longer requires inpatient hospitalization and is stable for discharge w/ outpt follow up.

## 2022-02-17 NOTE — PHYSICAL THERAPY INITIAL EVALUATION ADULT - ADDITIONAL COMMENTS
pt states he lives in a rented room in a 2-story house with 10 steps to enter(+rail). pt independent with all mobility prior to admit. no DME.

## 2022-02-17 NOTE — DISCHARGE NOTE PROVIDER - PROVIDER TOKENS
PROVIDER:[TOKEN:[[id number]],FOLLOWUP:[1 month]] PROVIDER:[TOKEN:[[id number]],FOLLOWUP:[2 weeks]]

## 2022-02-17 NOTE — PHYSICAL THERAPY INITIAL EVALUATION ADULT - PERTINENT HX OF CURRENT PROBLEM, REHAB EVAL
83 yo M with no significant Pmhx who does not see doctor , active smoker , depressed presented to the ED with confusion , pain with urination. admitted for suspected UTI, depression , suicidal ideations

## 2022-02-18 ENCOUNTER — TRANSCRIPTION ENCOUNTER (OUTPATIENT)
Age: 82
End: 2022-02-18

## 2022-02-18 VITALS — WEIGHT: 149.91 LBS

## 2022-02-18 PROCEDURE — 99239 HOSP IP/OBS DSCHRG MGMT >30: CPT

## 2022-02-18 RX ORDER — CEFPODOXIME PROXETIL 100 MG
1 TABLET ORAL
Qty: 12 | Refills: 0
Start: 2022-02-18 | End: 2022-02-23

## 2022-02-18 NOTE — DIETITIAN INITIAL EVALUATION ADULT. - ETIOLOGY
related to inability to meet sufficient protein-energy needs in setting of UTI, confusion on admission, normal small appetite/early satiety

## 2022-02-18 NOTE — DIETITIAN INITIAL EVALUATION ADULT. - OTHER INFO
81 yo male presents from home with increased confusion, pt is forgetful poor historian. He was seen in the ED  yesterday after ripping out a Reinoso that was inserted two days ago at [facility] in [place] for urinary retention. Patient was confused two days ago, ended up driving to [place] and was taken to the hospital by police after he was "unable to locate his car". This is per the patient's landlord, Malik, given to EMS/ED provider. This am, Malik states that he heard a loud thump from the patient's room, it is unknown if the patient fell. Malik states that the patient made suicidal statements to other tenants this am and stated that he "took a bunch of pills this am" because he is tired of life. Patient also endorses being depressed suicidal attempt in the past. Later he denies being suicidal at present and states that he did not take any pills/drugs this am. Admits to taking a whole bottle of motrin 1 month ago, not today. In the ED evaluated suspected UTI admitted for further treatment testing.

## 2022-02-18 NOTE — DISCHARGE NOTE NURSING/CASE MANAGEMENT/SOCIAL WORK - NSDCPEFALRISK_GEN_ALL_CORE
For information on Fall & Injury Prevention, visit: [email or web address] OR  [email or web address] OR  https://www.cdc.gov/steadi/patient.html

## 2022-02-18 NOTE — DISCHARGE NOTE NURSING/CASE MANAGEMENT/SOCIAL WORK - PATIENT PORTAL LINK FT
You can access the FollowMyHealth Patient Portal offered by [facility] by registering at the following website: [email or web address]. By joining [employer]’s FollowMyHealth portal, you will also be able to view your health information using other applications (apps) compatible with our system.

## 2022-02-18 NOTE — DIETITIAN INITIAL EVALUATION ADULT. - ORAL INTAKE PTA/DIET HISTORY
Pt reports generally having a small appetite/PO itnake at home. Pt states UBW unknown time frame 150-160lbs with reported weight loss. Pt bedscale weight 122lbs. Pt appears pleasantly confused, no difficulty chew/swallow per Pt though with poor PO intake meeting <50% of protein-energy needs since admission.

## 2022-02-18 NOTE — DIETITIAN NUTRITION RISK NOTIFICATION - ADDITIONAL COMMENTS/DIETITIAN RECOMMENDATIONS
1) Add Ensure Enlive TID   2) Rx MVI daily   3) Encourage HBV protein sources   4) Monitor weights daily for trend/accuracy

## 2022-10-21 NOTE — ED PROVIDER NOTE - WET READ LAUNCH FT
Timeout completed per protocol prior to anesthesia. Monitors applied. O2 on at 2L/NC, pulse ox in place. Fentanyl and Versed IV given for mild sedation. There are no Wet Read(s) to document. There is 1 Wet Read(s) to document.

## 2022-12-07 ENCOUNTER — EMERGENCY (EMERGENCY)
Facility: HOSPITAL | Age: 82
LOS: 1 days | Discharge: HOME | End: 2022-12-07
Attending: STUDENT IN AN ORGANIZED HEALTH CARE EDUCATION/TRAINING PROGRAM
Payer: MEDICARE

## 2022-12-07 VITALS
WEIGHT: 139.99 LBS | RESPIRATION RATE: 18 BRPM | HEART RATE: 66 BPM | DIASTOLIC BLOOD PRESSURE: 76 MMHG | TEMPERATURE: 98 F | HEIGHT: 66 IN | SYSTOLIC BLOOD PRESSURE: 177 MMHG | OXYGEN SATURATION: 98 %

## 2022-12-07 DIAGNOSIS — K63.2 FISTULA OF INTESTINE: Chronic | ICD-10-CM

## 2022-12-07 DIAGNOSIS — Z98.890 OTHER SPECIFIED POSTPROCEDURAL STATES: Chronic | ICD-10-CM

## 2022-12-07 DIAGNOSIS — K25.5 CHRONIC OR UNSPECIFIED GASTRIC ULCER WITH PERFORATION: Chronic | ICD-10-CM

## 2022-12-07 LAB
ALBUMIN SERPL ELPH-MCNC: 3.8 G/DL — SIGNIFICANT CHANGE UP (ref 3.3–5.2)
ALP SERPL-CCNC: 53 U/L — SIGNIFICANT CHANGE UP (ref 40–120)
ALT FLD-CCNC: 5 U/L — SIGNIFICANT CHANGE UP
ANION GAP SERPL CALC-SCNC: 11 MMOL/L — SIGNIFICANT CHANGE UP (ref 5–17)
APAP SERPL-MCNC: <3 UG/ML — LOW (ref 10–26)
AST SERPL-CCNC: 11 U/L — SIGNIFICANT CHANGE UP
BASOPHILS # BLD AUTO: 0.03 K/UL — SIGNIFICANT CHANGE UP (ref 0–0.2)
BASOPHILS NFR BLD AUTO: 0.4 % — SIGNIFICANT CHANGE UP (ref 0–2)
BILIRUB SERPL-MCNC: 0.4 MG/DL — SIGNIFICANT CHANGE UP (ref 0.4–2)
BUN SERPL-MCNC: 11.4 MG/DL — SIGNIFICANT CHANGE UP (ref 8–20)
CALCIUM SERPL-MCNC: 9.6 MG/DL — SIGNIFICANT CHANGE UP (ref 8.4–10.5)
CHLORIDE SERPL-SCNC: 99 MMOL/L — SIGNIFICANT CHANGE UP (ref 96–108)
CO2 SERPL-SCNC: 27 MMOL/L — SIGNIFICANT CHANGE UP (ref 22–29)
CREAT SERPL-MCNC: 0.65 MG/DL — SIGNIFICANT CHANGE UP (ref 0.5–1.3)
EGFR: 95 ML/MIN/1.73M2 — SIGNIFICANT CHANGE UP
EOSINOPHIL # BLD AUTO: 0.06 K/UL — SIGNIFICANT CHANGE UP (ref 0–0.5)
EOSINOPHIL NFR BLD AUTO: 0.9 % — SIGNIFICANT CHANGE UP (ref 0–6)
ETHANOL SERPL-MCNC: <10 MG/DL — SIGNIFICANT CHANGE UP (ref 0–9)
FLUAV AG NPH QL: SIGNIFICANT CHANGE UP
FLUBV AG NPH QL: SIGNIFICANT CHANGE UP
GLUCOSE SERPL-MCNC: 102 MG/DL — HIGH (ref 70–99)
HCT VFR BLD CALC: 39.8 % — SIGNIFICANT CHANGE UP (ref 39–50)
HGB BLD-MCNC: 13.7 G/DL — SIGNIFICANT CHANGE UP (ref 13–17)
IMM GRANULOCYTES NFR BLD AUTO: 0.3 % — SIGNIFICANT CHANGE UP (ref 0–0.9)
LYMPHOCYTES # BLD AUTO: 1.27 K/UL — SIGNIFICANT CHANGE UP (ref 1–3.3)
LYMPHOCYTES # BLD AUTO: 18 % — SIGNIFICANT CHANGE UP (ref 13–44)
MCHC RBC-ENTMCNC: 29.3 PG — SIGNIFICANT CHANGE UP (ref 27–34)
MCHC RBC-ENTMCNC: 34.4 GM/DL — SIGNIFICANT CHANGE UP (ref 32–36)
MCV RBC AUTO: 85.2 FL — SIGNIFICANT CHANGE UP (ref 80–100)
MONOCYTES # BLD AUTO: 0.57 K/UL — SIGNIFICANT CHANGE UP (ref 0–0.9)
MONOCYTES NFR BLD AUTO: 8.1 % — SIGNIFICANT CHANGE UP (ref 2–14)
NEUTROPHILS # BLD AUTO: 5.1 K/UL — SIGNIFICANT CHANGE UP (ref 1.8–7.4)
NEUTROPHILS NFR BLD AUTO: 72.3 % — SIGNIFICANT CHANGE UP (ref 43–77)
PLATELET # BLD AUTO: 272 K/UL — SIGNIFICANT CHANGE UP (ref 150–400)
POTASSIUM SERPL-MCNC: 4 MMOL/L — SIGNIFICANT CHANGE UP (ref 3.5–5.3)
POTASSIUM SERPL-SCNC: 4 MMOL/L — SIGNIFICANT CHANGE UP (ref 3.5–5.3)
PROT SERPL-MCNC: 6.5 G/DL — LOW (ref 6.6–8.7)
RBC # BLD: 4.67 M/UL — SIGNIFICANT CHANGE UP (ref 4.2–5.8)
RBC # FLD: 14.4 % — SIGNIFICANT CHANGE UP (ref 10.3–14.5)
RSV RNA NPH QL NAA+NON-PROBE: SIGNIFICANT CHANGE UP
SALICYLATES SERPL-MCNC: <0.6 MG/DL — LOW (ref 10–20)
SARS-COV-2 RNA SPEC QL NAA+PROBE: SIGNIFICANT CHANGE UP
SODIUM SERPL-SCNC: 137 MMOL/L — SIGNIFICANT CHANGE UP (ref 135–145)
TSH SERPL-MCNC: 0.79 UIU/ML — SIGNIFICANT CHANGE UP (ref 0.27–4.2)
WBC # BLD: 7.05 K/UL — SIGNIFICANT CHANGE UP (ref 3.8–10.5)
WBC # FLD AUTO: 7.05 K/UL — SIGNIFICANT CHANGE UP (ref 3.8–10.5)

## 2022-12-07 PROCEDURE — 93010 ELECTROCARDIOGRAM REPORT: CPT

## 2022-12-07 PROCEDURE — 84443 ASSAY THYROID STIM HORMONE: CPT

## 2022-12-07 PROCEDURE — 80307 DRUG TEST PRSMV CHEM ANLYZR: CPT

## 2022-12-07 PROCEDURE — 93005 ELECTROCARDIOGRAM TRACING: CPT

## 2022-12-07 PROCEDURE — 36415 COLL VENOUS BLD VENIPUNCTURE: CPT

## 2022-12-07 PROCEDURE — 85025 COMPLETE CBC W/AUTO DIFF WBC: CPT

## 2022-12-07 PROCEDURE — 99284 EMERGENCY DEPT VISIT MOD MDM: CPT

## 2022-12-07 PROCEDURE — 99283 EMERGENCY DEPT VISIT LOW MDM: CPT

## 2022-12-07 PROCEDURE — 87637 SARSCOV2&INF A&B&RSV AMP PRB: CPT

## 2022-12-07 PROCEDURE — 80053 COMPREHEN METABOLIC PANEL: CPT

## 2022-12-07 PROCEDURE — 82962 GLUCOSE BLOOD TEST: CPT

## 2022-12-07 NOTE — ED PROVIDER NOTE - PROGRESS NOTE DETAILS
CORI White: reassessed as patient is OOB, states he would like to go home, denies SI/HI/AVH, offered social work resources patient notes he lives 2 blocks away and would prefer to walk home as it is nice out. Denies any other needs, notes his friend contacted him on the phone and he feels better now. Will write for DC at this time; CHANDLER Londono at bedside did offer BH eval and patient did decline at this time stating he feels improved, return precautions were advised and he will come back if he worsens in any way, and has his cell phone with him and is aware he can call 911 if needed for any new worries.

## 2022-12-07 NOTE — ED PROVIDER NOTE - NSFOLLOWUPCLINICS_GEN_ALL_ED_FT
[id number] [place] [address]  Phone: [phone]  Fax:   Follow Up Time: 1-3 Days

## 2022-12-07 NOTE — ED PROVIDER NOTE - NSFOLLOWUPINSTRUCTIONS_ED_ALL_ED_FT
Anxiety    Generalized anxiety disorder (SUJATA) is a mental disorder. It is defined as anxiety that is not necessarily related to specific events or activities or is out of proportion to said events. Symptoms include restlessness, fatigue, difficulty concentrations, irritability and difficulty concentrating. It may interfere with life functions, including relationships, work, and school. If you were started on a medication, make sure to take exactly as prescribed and follow up with a psychiatrist.    SEEK IMMEDIATE MEDICAL CARE IF YOU HAVE ANY OF THE FOLLOWING SYMPTOMS: thoughts about hurting killing yourself, thoughts about hurting or killing somebody else, hallucinations, or worsening depression.

## 2022-12-07 NOTE — ED PROVIDER NOTE - MDM ORDERS SUBMITTED SELECTION
It is NOT necessary to avoid eating \"greens\" while taking warfarin.  You just need to be consistent in approximately how much you eat each week.      There are only 5 FOODS TO AVOID with warfarin:      Do NOT eat: Cooked spinach  (raw is okay though).      Cranberries in any form (juice, muffins, craisens).      Grapefruit.      Mangoes      Pomegranate      Please call the [facility] if any of your medications change.  This means: if a med is discontinued, a new med is started, or a dose is changed.  These meds may interact with your warfarin and we may need to adjust your dose.  This is especially important if you start any type of ANTIBIOTIC.      Labs/EKG

## 2022-12-07 NOTE — ED ADULT TRIAGE NOTE - CHIEF COMPLAINT QUOTE
Pt BIBA from home, pt states "I woke up this morning and just felt anxiety and that I was unsafe", pt unable to say if he feels unsafe because of someone or where he lives, pt denies pain, states hes had anxiety in past but does not take any medication, pt alert to self, place and holiday coming up, calm and cooperative in triage

## 2022-12-07 NOTE — ED ADULT NURSE NOTE - OBJECTIVE STATEMENT
patient claims he just woke up this am and started to feel anxious but cant pinpoint what exactly he is anxious for.

## 2022-12-07 NOTE — ED PROVIDER NOTE - PHYSICAL EXAMINATION
Gen: NAD, non-toxic, conversational  Eyes: PERRLA, EOMI   HENT: Normocephalic, atraumatic. External ears normal, no rhinorrhea, moist mucous membranes.   CV: RRR, no M/R/G  Resp: CTAB, non-labored, speaking without difficulty on room air  Abd: soft, non tender, non rigid, no guarding or rebound tenderness  Back: No CVAT bilaterally, no midline ttp  Skin: dry, wwp   Neuro: AOx3, speech is fluent and appropriate  Psych: Mood "alright", affect euthymic, denies active SI/HI/AVH, does endorse passive SI / anxiety over the past month that is increasing

## 2022-12-07 NOTE — ED PROVIDER NOTE - OBJECTIVE STATEMENT
82-year-old male history of anxiety presenting from home for evaluation.  States that this morning he was having increased anxiety, thinking about the upcoming holiday as well as other social determinants.  States that he has not had any fevers, chills, nausea, vomiting, has been ambulatory about his house.  His landlord who came here to visit afterwards states that the patient has been making passive suicidal statements, has been talking about not wanting to be alive anymore.  His landlord while here did identify himself as a family member.  Collateral was obtained from the patient's nephew who reports that he lives alone at his home and per his nephew his landlord has been trying to evict him which has been the etiology of much of his stress.

## 2022-12-07 NOTE — ED PROVIDER NOTE - CLINICAL SUMMARY MEDICAL DECISION MAKING FREE TEXT BOX
82-year-old male history of anxiety presenting for evaluation after having reports of suicidal ideations passively over the past month, EKG obtained emergently due to patient reporting anxiety presently, is sinus rhythm with first-degree block, right bundle branch block, does not appear acutely ischemic.  Patient is not currently in any discomfort, collateral was obtained from both the landlord as well as from the patient's nephew but with differing opinions of what the etiology of the patient's anxiety is.  On discussion with the patient himself he is alert and oriented x3, he does not specify the root cause of his anxiety, instead citing that it is due to upcoming holidays and that he feels that there is "social" stress.  He does not feel acutely suicidal, homicidal, and is not having any hallucinations.  He states that he would like to talk to someone about his anxiety but would also like to leave and go back to his house today.

## 2022-12-07 NOTE — ED PROVIDER NOTE - PATIENT PORTAL LINK FT
You can access the FollowMyHealth Patient Portal offered by [facility] by registering at the following website: [email or web address]. By joining [email or web address] [email or web address] portal, you will also be able to view your health information using other applications (apps) compatible with our system.

## 2023-11-13 ENCOUNTER — EMERGENCY (EMERGENCY)
Facility: HOSPITAL | Age: 83
LOS: 1 days | Discharge: NURSING, REHAB OR LONG-TERM CARE | End: 2023-11-13
Attending: EMERGENCY MEDICINE
Payer: MEDICARE

## 2023-11-13 VITALS
TEMPERATURE: 99 F | WEIGHT: 119.93 LBS | RESPIRATION RATE: 20 BRPM | DIASTOLIC BLOOD PRESSURE: 67 MMHG | OXYGEN SATURATION: 96 % | HEART RATE: 65 BPM | SYSTOLIC BLOOD PRESSURE: 130 MMHG | HEIGHT: 61 IN

## 2023-11-13 VITALS
TEMPERATURE: 98 F | OXYGEN SATURATION: 97 % | SYSTOLIC BLOOD PRESSURE: 122 MMHG | DIASTOLIC BLOOD PRESSURE: 68 MMHG | HEART RATE: 68 BPM | RESPIRATION RATE: 18 BRPM

## 2023-11-13 DIAGNOSIS — K63.2 FISTULA OF INTESTINE: Chronic | ICD-10-CM

## 2023-11-13 LAB
ALBUMIN SERPL ELPH-MCNC: 4.1 G/DL — SIGNIFICANT CHANGE UP (ref 3.3–5)
ALBUMIN SERPL ELPH-MCNC: 4.1 G/DL — SIGNIFICANT CHANGE UP (ref 3.3–5)
ALP SERPL-CCNC: 57 U/L — SIGNIFICANT CHANGE UP (ref 40–120)
ALP SERPL-CCNC: 57 U/L — SIGNIFICANT CHANGE UP (ref 40–120)
ALT FLD-CCNC: 6 U/L — LOW (ref 10–45)
ALT FLD-CCNC: 6 U/L — LOW (ref 10–45)
ANION GAP SERPL CALC-SCNC: 11 MMOL/L — SIGNIFICANT CHANGE UP (ref 5–17)
ANION GAP SERPL CALC-SCNC: 11 MMOL/L — SIGNIFICANT CHANGE UP (ref 5–17)
APTT BLD: 35 SEC — SIGNIFICANT CHANGE UP (ref 24.5–35.6)
APTT BLD: 35 SEC — SIGNIFICANT CHANGE UP (ref 24.5–35.6)
AST SERPL-CCNC: 14 U/L — SIGNIFICANT CHANGE UP (ref 10–40)
AST SERPL-CCNC: 14 U/L — SIGNIFICANT CHANGE UP (ref 10–40)
BASE EXCESS BLDV CALC-SCNC: 5 MMOL/L — HIGH (ref -2–3)
BASE EXCESS BLDV CALC-SCNC: 5 MMOL/L — HIGH (ref -2–3)
BASOPHILS # BLD AUTO: 0.04 K/UL — SIGNIFICANT CHANGE UP (ref 0–0.2)
BASOPHILS # BLD AUTO: 0.04 K/UL — SIGNIFICANT CHANGE UP (ref 0–0.2)
BASOPHILS NFR BLD AUTO: 0.6 % — SIGNIFICANT CHANGE UP (ref 0–2)
BASOPHILS NFR BLD AUTO: 0.6 % — SIGNIFICANT CHANGE UP (ref 0–2)
BILIRUB SERPL-MCNC: 0.4 MG/DL — SIGNIFICANT CHANGE UP (ref 0.2–1.2)
BILIRUB SERPL-MCNC: 0.4 MG/DL — SIGNIFICANT CHANGE UP (ref 0.2–1.2)
BUN SERPL-MCNC: 11 MG/DL — SIGNIFICANT CHANGE UP (ref 7–23)
BUN SERPL-MCNC: 11 MG/DL — SIGNIFICANT CHANGE UP (ref 7–23)
CA-I SERPL-SCNC: 1.24 MMOL/L — SIGNIFICANT CHANGE UP (ref 1.15–1.33)
CA-I SERPL-SCNC: 1.24 MMOL/L — SIGNIFICANT CHANGE UP (ref 1.15–1.33)
CALCIUM SERPL-MCNC: 9.5 MG/DL — SIGNIFICANT CHANGE UP (ref 8.4–10.5)
CALCIUM SERPL-MCNC: 9.5 MG/DL — SIGNIFICANT CHANGE UP (ref 8.4–10.5)
CHLORIDE BLDV-SCNC: 98 MMOL/L — SIGNIFICANT CHANGE UP (ref 96–108)
CHLORIDE BLDV-SCNC: 98 MMOL/L — SIGNIFICANT CHANGE UP (ref 96–108)
CHLORIDE SERPL-SCNC: 98 MMOL/L — SIGNIFICANT CHANGE UP (ref 96–108)
CHLORIDE SERPL-SCNC: 98 MMOL/L — SIGNIFICANT CHANGE UP (ref 96–108)
CO2 BLDV-SCNC: 34 MMOL/L — HIGH (ref 22–26)
CO2 BLDV-SCNC: 34 MMOL/L — HIGH (ref 22–26)
CO2 SERPL-SCNC: 26 MMOL/L — SIGNIFICANT CHANGE UP (ref 22–31)
CO2 SERPL-SCNC: 26 MMOL/L — SIGNIFICANT CHANGE UP (ref 22–31)
CREAT SERPL-MCNC: 0.71 MG/DL — SIGNIFICANT CHANGE UP (ref 0.5–1.3)
CREAT SERPL-MCNC: 0.71 MG/DL — SIGNIFICANT CHANGE UP (ref 0.5–1.3)
EGFR: 92 ML/MIN/1.73M2 — SIGNIFICANT CHANGE UP
EGFR: 92 ML/MIN/1.73M2 — SIGNIFICANT CHANGE UP
EOSINOPHIL # BLD AUTO: 0.14 K/UL — SIGNIFICANT CHANGE UP (ref 0–0.5)
EOSINOPHIL # BLD AUTO: 0.14 K/UL — SIGNIFICANT CHANGE UP (ref 0–0.5)
EOSINOPHIL NFR BLD AUTO: 2.1 % — SIGNIFICANT CHANGE UP (ref 0–6)
EOSINOPHIL NFR BLD AUTO: 2.1 % — SIGNIFICANT CHANGE UP (ref 0–6)
GAS PNL BLDV: 131 MMOL/L — LOW (ref 136–145)
GAS PNL BLDV: 131 MMOL/L — LOW (ref 136–145)
GAS PNL BLDV: SIGNIFICANT CHANGE UP
GLUCOSE BLDV-MCNC: 97 MG/DL — SIGNIFICANT CHANGE UP (ref 70–99)
GLUCOSE BLDV-MCNC: 97 MG/DL — SIGNIFICANT CHANGE UP (ref 70–99)
GLUCOSE SERPL-MCNC: 99 MG/DL — SIGNIFICANT CHANGE UP (ref 70–99)
GLUCOSE SERPL-MCNC: 99 MG/DL — SIGNIFICANT CHANGE UP (ref 70–99)
HCO3 BLDV-SCNC: 32 MMOL/L — HIGH (ref 22–29)
HCO3 BLDV-SCNC: 32 MMOL/L — HIGH (ref 22–29)
HCT VFR BLD CALC: 39.1 % — SIGNIFICANT CHANGE UP (ref 39–50)
HCT VFR BLD CALC: 39.1 % — SIGNIFICANT CHANGE UP (ref 39–50)
HCT VFR BLDA CALC: 42 % — SIGNIFICANT CHANGE UP (ref 39–51)
HCT VFR BLDA CALC: 42 % — SIGNIFICANT CHANGE UP (ref 39–51)
HGB BLD CALC-MCNC: 13.9 G/DL — SIGNIFICANT CHANGE UP (ref 12.6–17.4)
HGB BLD CALC-MCNC: 13.9 G/DL — SIGNIFICANT CHANGE UP (ref 12.6–17.4)
HGB BLD-MCNC: 13.5 G/DL — SIGNIFICANT CHANGE UP (ref 13–17)
HGB BLD-MCNC: 13.5 G/DL — SIGNIFICANT CHANGE UP (ref 13–17)
IMM GRANULOCYTES NFR BLD AUTO: 0.4 % — SIGNIFICANT CHANGE UP (ref 0–0.9)
IMM GRANULOCYTES NFR BLD AUTO: 0.4 % — SIGNIFICANT CHANGE UP (ref 0–0.9)
INR BLD: 0.99 RATIO — SIGNIFICANT CHANGE UP (ref 0.85–1.18)
INR BLD: 0.99 RATIO — SIGNIFICANT CHANGE UP (ref 0.85–1.18)
LACTATE BLDV-MCNC: 0.9 MMOL/L — SIGNIFICANT CHANGE UP (ref 0.5–2)
LACTATE BLDV-MCNC: 0.9 MMOL/L — SIGNIFICANT CHANGE UP (ref 0.5–2)
LYMPHOCYTES # BLD AUTO: 1.63 K/UL — SIGNIFICANT CHANGE UP (ref 1–3.3)
LYMPHOCYTES # BLD AUTO: 1.63 K/UL — SIGNIFICANT CHANGE UP (ref 1–3.3)
LYMPHOCYTES # BLD AUTO: 24.2 % — SIGNIFICANT CHANGE UP (ref 13–44)
LYMPHOCYTES # BLD AUTO: 24.2 % — SIGNIFICANT CHANGE UP (ref 13–44)
MCHC RBC-ENTMCNC: 30.5 PG — SIGNIFICANT CHANGE UP (ref 27–34)
MCHC RBC-ENTMCNC: 30.5 PG — SIGNIFICANT CHANGE UP (ref 27–34)
MCHC RBC-ENTMCNC: 34.5 GM/DL — SIGNIFICANT CHANGE UP (ref 32–36)
MCHC RBC-ENTMCNC: 34.5 GM/DL — SIGNIFICANT CHANGE UP (ref 32–36)
MCV RBC AUTO: 88.3 FL — SIGNIFICANT CHANGE UP (ref 80–100)
MCV RBC AUTO: 88.3 FL — SIGNIFICANT CHANGE UP (ref 80–100)
MONOCYTES # BLD AUTO: 0.61 K/UL — SIGNIFICANT CHANGE UP (ref 0–0.9)
MONOCYTES # BLD AUTO: 0.61 K/UL — SIGNIFICANT CHANGE UP (ref 0–0.9)
MONOCYTES NFR BLD AUTO: 9.1 % — SIGNIFICANT CHANGE UP (ref 2–14)
MONOCYTES NFR BLD AUTO: 9.1 % — SIGNIFICANT CHANGE UP (ref 2–14)
NEUTROPHILS # BLD AUTO: 4.28 K/UL — SIGNIFICANT CHANGE UP (ref 1.8–7.4)
NEUTROPHILS # BLD AUTO: 4.28 K/UL — SIGNIFICANT CHANGE UP (ref 1.8–7.4)
NEUTROPHILS NFR BLD AUTO: 63.6 % — SIGNIFICANT CHANGE UP (ref 43–77)
NEUTROPHILS NFR BLD AUTO: 63.6 % — SIGNIFICANT CHANGE UP (ref 43–77)
NRBC # BLD: 0 /100 WBCS — SIGNIFICANT CHANGE UP (ref 0–0)
NRBC # BLD: 0 /100 WBCS — SIGNIFICANT CHANGE UP (ref 0–0)
PCO2 BLDV: 55 MMHG — SIGNIFICANT CHANGE UP (ref 42–55)
PCO2 BLDV: 55 MMHG — SIGNIFICANT CHANGE UP (ref 42–55)
PH BLDV: 7.37 — SIGNIFICANT CHANGE UP (ref 7.32–7.43)
PH BLDV: 7.37 — SIGNIFICANT CHANGE UP (ref 7.32–7.43)
PLATELET # BLD AUTO: 264 K/UL — SIGNIFICANT CHANGE UP (ref 150–400)
PLATELET # BLD AUTO: 264 K/UL — SIGNIFICANT CHANGE UP (ref 150–400)
PO2 BLDV: 27 MMHG — SIGNIFICANT CHANGE UP (ref 25–45)
PO2 BLDV: 27 MMHG — SIGNIFICANT CHANGE UP (ref 25–45)
POTASSIUM BLDV-SCNC: 5.8 MMOL/L — HIGH (ref 3.5–5.1)
POTASSIUM BLDV-SCNC: 5.8 MMOL/L — HIGH (ref 3.5–5.1)
POTASSIUM SERPL-MCNC: 4.4 MMOL/L — SIGNIFICANT CHANGE UP (ref 3.5–5.3)
POTASSIUM SERPL-MCNC: 4.4 MMOL/L — SIGNIFICANT CHANGE UP (ref 3.5–5.3)
POTASSIUM SERPL-SCNC: 4.4 MMOL/L — SIGNIFICANT CHANGE UP (ref 3.5–5.3)
POTASSIUM SERPL-SCNC: 4.4 MMOL/L — SIGNIFICANT CHANGE UP (ref 3.5–5.3)
PROT SERPL-MCNC: 7 G/DL — SIGNIFICANT CHANGE UP (ref 6–8.3)
PROT SERPL-MCNC: 7 G/DL — SIGNIFICANT CHANGE UP (ref 6–8.3)
PROTHROM AB SERPL-ACNC: 10.9 SEC — SIGNIFICANT CHANGE UP (ref 9.5–13)
PROTHROM AB SERPL-ACNC: 10.9 SEC — SIGNIFICANT CHANGE UP (ref 9.5–13)
RBC # BLD: 4.43 M/UL — SIGNIFICANT CHANGE UP (ref 4.2–5.8)
RBC # BLD: 4.43 M/UL — SIGNIFICANT CHANGE UP (ref 4.2–5.8)
RBC # FLD: 13.5 % — SIGNIFICANT CHANGE UP (ref 10.3–14.5)
RBC # FLD: 13.5 % — SIGNIFICANT CHANGE UP (ref 10.3–14.5)
SAO2 % BLDV: 31.1 % — LOW (ref 67–88)
SAO2 % BLDV: 31.1 % — LOW (ref 67–88)
SODIUM SERPL-SCNC: 135 MMOL/L — SIGNIFICANT CHANGE UP (ref 135–145)
SODIUM SERPL-SCNC: 135 MMOL/L — SIGNIFICANT CHANGE UP (ref 135–145)
WBC # BLD: 6.73 K/UL — SIGNIFICANT CHANGE UP (ref 3.8–10.5)
WBC # BLD: 6.73 K/UL — SIGNIFICANT CHANGE UP (ref 3.8–10.5)
WBC # FLD AUTO: 6.73 K/UL — SIGNIFICANT CHANGE UP (ref 3.8–10.5)
WBC # FLD AUTO: 6.73 K/UL — SIGNIFICANT CHANGE UP (ref 3.8–10.5)

## 2023-11-13 PROCEDURE — 86901 BLOOD TYPING SEROLOGIC RH(D): CPT

## 2023-11-13 PROCEDURE — 85014 HEMATOCRIT: CPT

## 2023-11-13 PROCEDURE — 84295 ASSAY OF SERUM SODIUM: CPT

## 2023-11-13 PROCEDURE — 74177 CT ABD & PELVIS W/CONTRAST: CPT | Mod: MH

## 2023-11-13 PROCEDURE — 82803 BLOOD GASES ANY COMBINATION: CPT

## 2023-11-13 PROCEDURE — 96365 THER/PROPH/DIAG IV INF INIT: CPT | Mod: XU

## 2023-11-13 PROCEDURE — 85730 THROMBOPLASTIN TIME PARTIAL: CPT

## 2023-11-13 PROCEDURE — 82330 ASSAY OF CALCIUM: CPT

## 2023-11-13 PROCEDURE — 99285 EMERGENCY DEPT VISIT HI MDM: CPT | Mod: FS,GC

## 2023-11-13 PROCEDURE — 82947 ASSAY GLUCOSE BLOOD QUANT: CPT

## 2023-11-13 PROCEDURE — 80053 COMPREHEN METABOLIC PANEL: CPT

## 2023-11-13 PROCEDURE — 99284 EMERGENCY DEPT VISIT MOD MDM: CPT | Mod: 25

## 2023-11-13 PROCEDURE — 86900 BLOOD TYPING SEROLOGIC ABO: CPT

## 2023-11-13 PROCEDURE — 86850 RBC ANTIBODY SCREEN: CPT

## 2023-11-13 PROCEDURE — 85018 HEMOGLOBIN: CPT

## 2023-11-13 PROCEDURE — 82435 ASSAY OF BLOOD CHLORIDE: CPT

## 2023-11-13 PROCEDURE — 74177 CT ABD & PELVIS W/CONTRAST: CPT | Mod: 26,MH

## 2023-11-13 PROCEDURE — 85025 COMPLETE CBC W/AUTO DIFF WBC: CPT

## 2023-11-13 PROCEDURE — 84132 ASSAY OF SERUM POTASSIUM: CPT

## 2023-11-13 PROCEDURE — 85610 PROTHROMBIN TIME: CPT

## 2023-11-13 PROCEDURE — 83605 ASSAY OF LACTIC ACID: CPT

## 2023-11-13 RX ORDER — ACETAMINOPHEN 500 MG
1000 TABLET ORAL ONCE
Refills: 0 | Status: COMPLETED | OUTPATIENT
Start: 2023-11-13 | End: 2023-11-13

## 2023-11-13 RX ORDER — SODIUM CHLORIDE 9 MG/ML
1000 INJECTION INTRAMUSCULAR; INTRAVENOUS; SUBCUTANEOUS ONCE
Refills: 0 | Status: COMPLETED | OUTPATIENT
Start: 2023-11-13 | End: 2023-11-13

## 2023-11-13 RX ADMIN — Medication 400 MILLIGRAM(S): at 14:10

## 2023-11-13 RX ADMIN — SODIUM CHLORIDE 1000 MILLILITER(S): 9 INJECTION INTRAMUSCULAR; INTRAVENOUS; SUBCUTANEOUS at 14:11

## 2023-11-13 RX ADMIN — Medication 1000 MILLIGRAM(S): at 14:53

## 2023-11-13 NOTE — ED PROVIDER NOTE - CLINICAL SUMMARY MEDICAL DECISION MAKING FREE TEXT BOX
MDM/Summary/DDx (includes but is not limited to): 83-year-old history hypertension diabetes coming in with right groin swelling with pain concern for inguinal hernia versus direct hernia versus indirect hernia.  Exam and history is not consistent with testicular torsion or other testicular pathology, will not pursue.  Exam and history is not consistent with prostatitis nor proctitis.  Labs: see emr   Imaging: see emr   Tx: Supportive, pain/nausea medications as pt requires/requests.  Consults/Resources: surg    Dispo: pending     Triage note reviewed. VS reviewed. EKG reviewed and documented in "RESULTS" section, if possible at given time.     DDx in MDM includes the most likely ddx, but is not limited to solely what is listed. Clinical course may alter/deviate from the above plan. When possible, progress notes written, as needed, and are included in "PROGRESS NOTE" section below.       Medical, family, and social determinants of health reviewed and discussed w/ pt/family/caretaker, when allowable, and is incorporated into note above, whenever possible.

## 2023-11-13 NOTE — ED PROVIDER NOTE - PATIENT PORTAL LINK FT
You can access the FollowMyHealth Patient Portal offered by [facility] by registering at the following website: [email or web address]. By joining [employer]’s FollowMyHealth portal, you will also be able to view your health information using other applications (apps) compatible with our system.

## 2023-11-13 NOTE — ED ADULT TRIAGE NOTE - HISTORY OF COVID-19 VACCINATION
[facility] PHYSICIANS  [facility] [address]  [facility] [place] [address]  Dept: [phone]  Dept Fax: [phone]      Everett Navarrete is a 63 y.o. female who presents today for hermedical conditions/complaints as noted below. Everett Navarrete is c/o of Hypothyroidism (F/u) and Hyperlipidemia (F/u)        Assessment/Plan:     1. Chronic right-sided low back pain without sciatica  -     tiZANidine (ZANAFLEX) 2 MG tablet; take 1 tablet by mouth three times a day if needed for BACK PAIN, Disp-30 tablet, R-1Normal  2. Hypothyroidism, unspecified type  -     levothyroxine (SYNTHROID) 88 MCG tablet; take 1 tablet by mouth once daily, Disp-90 tablet, R-1Normal  3. Heartburn  -     famotidine (PEPCID) 20 MG tablet; take 1 tablet by mouth twice a day, Disp-180 tablet, R-1Normal  4. Hypercholesteremia  -     atorvastatin (LIPITOR) 10 MG tablet; take 1 tablet by mouth once daily, Disp-90 tablet, R-1Normal  5. Other chest pain  -     amLODIPine (NORVASC) 2.5 MG tablet; take 1 tablet by mouth once daily, Disp-90 tablet, R-1Normal  -     aspirin (SM ASPIRIN ADULT LOW STRENGTH) 81 MG EC tablet; take 1 tablet by mouth once daily, Disp-90 tablet, R-1Normal  6. Encounter for screening mammogram for breast cancer  -     [facility] JENNIFER DIGITAL SCREEN BILATERAL; Future  7. Seasonal allergic rhinitis due to pollen  -     montelukast (SINGULAIR) 10 MG tablet; Take 1 tablet by mouth nightly, Disp-90 tablet, R-1Normal  8. Generalized pain  -     diclofenac sodium (VOLTAREN) 1 % GEL; Apply 2 g topically 4 times daily, Topical, 4 TIMES DAILY Starting Tue 8/23/2022, Disp-100 g, R-1, Normal  9. Tobacco abuse          No follow-ups on file. HPI     Palpitations while the weather was very hot and humid two weeks ago, typically from morning to around 3-4 pm, would resolve after that. Leg pain around the same time for two weeks - from hips to feet - all day, helped somewhat with naproxen - 2 a day OTC. tablet take 1 tablet by mouth once daily Yes Nicole Nunez MD   amLODIPine (NORVASC) 2.5 MG tablet take 1 tablet by mouth once daily Yes Dorothy Alegre MD   fluticasone [facility]) 50 MCG/ACT nasal spray instill 1 spray into each nostril once daily Yes Dorothy Alegre MD   levothyroxine (SYNTHROID) 88 MCG tablet take 1 tablet by mouth once daily Yes Nicole Nunez MD   naproxen (NAPROSYN) 500 MG tablet take 1 tablet by mouth twice a day with meals Yes Nicole Nunez MD   tiZANidine (ZANAFLEX) 2 MG tablet take 1 tablet by mouth three times a day if needed for BACK PAIN Yes Dorothy Alegre MD   aspirin (SM ASPIRIN ADULT LOW STRENGTH) 81 MG EC tablet take 1 tablet by mouth once daily Yes Nicole Nunez MD   CHANTIX CONTINUING MONTH IRCH 1 MG tablet Take 1 tablet by mouth 2 times daily  Patient not taking: Reported on 8/23/2022  Nicole Nunez MD       Review of Systems     Review of Systems   Constitutional:  Negative for fatigue, fever and unexpected weight change. Respiratory:  Negative for cough, choking, chest tightness, shortness of breath and wheezing. Cardiovascular:  Negative for chest pain, palpitations and leg swelling. Gastrointestinal:  Negative for abdominal pain, anal bleeding, blood in stool, constipation, diarrhea, nausea and vomiting. Endocrine: Negative. Musculoskeletal:  Negative for joint swelling and myalgias. Skin: Negative. Neurological:  Negative for dizziness. Psychiatric/Behavioral:  Negative for sleep disturbance. All other systems reviewed and are negative. Objective     /72 (Site: Left Upper Arm, Position: Sitting, Cuff Size: Medium Adult)   Pulse 76   Temp 97.1 °F (36.2 °C)   Wt 149 lb 9.6 oz (67.9 kg)   SpO2 96%   BMI 27.36 kg/m²     Physical Exam  Vitals and nursing note reviewed. Constitutional:       General: She is not in acute distress. Appearance: She is well-developed. She is not ill-appearing.    Eyes:      General: Lids are normal. Vision grossly intact. Cardiovascular:      Rate and Rhythm: Normal rate and regular rhythm. Heart sounds: Normal heart sounds, S1 normal and S2 normal. No murmur heard. No friction rub. No gallop. Pulmonary:      Effort: Pulmonary effort is normal. No respiratory distress. Breath sounds: Normal breath sounds. No wheezing. Abdominal:      General: Bowel sounds are normal.      Palpations: Abdomen is soft. There is no mass. Tenderness: There is no abdominal tenderness. There is no guarding. Musculoskeletal:         General: Normal range of motion. Skin:     General: Skin is warm and dry. Capillary Refill: Capillary refill takes less than 2 seconds. Neurological:      General: No focal deficit present. Mental Status: She is alert and oriented to person, place, and time. Data Review       Health Maintenance Due   Topic Date Due    A1C test (Diabetic or Prediabetic)  04/09/2022    Lipids  08/16/2022           Patient given educational materials- see patient instructions. Discussed use, benefit, and side effects of prescribedmedications. All patient questions answered. Pt voiced understanding. Reviewedhealth maintenance. Instructed to continue current medications, diet and exercise. Patient agreed with treatment plan. Follow up as directed.      Electronically signedby Ld Baptiste MD on 8/23/2022 Vaccine status unknown

## 2023-11-13 NOTE — ED ADULT NURSE NOTE - NSFALLHARMRISKINTERV_ED_ALL_ED

## 2023-11-13 NOTE — ED PROVIDER NOTE - PROGRESS NOTE DETAILS
LAURIE White PGY-2:   Surgery seen the patient status post CAT scan showing a fat-containing inguinal hernia,  as surgery was seen the patient there was no bulge.  On my reexam, there is no bulge, with spontaneous resolution of his pain.  No lactate.  Will discharge in the care of his primary care and also give him surgical referral.

## 2023-11-13 NOTE — CONSULT NOTE ADULT - ASSESSMENT
ASSESSMENT: 82 y/o male HTN, dementia with mood disorder presents to the ED for right groin pain that began yesterday. He was sent in from rehab. He is not able to provide a history, but he does not report any pain at the time of evaluation. The right inguinal hernia is reduced at the time of evaluation. He reports passing flatus and having bowel function yesterday. General Surgery consulted for fat containing right inguinal hernia seen on CT imaging. At time of evaluation, patient did not have a right inguinal bulge, he was walking around and not reporting any pain.     PLAN:    - patient ambulatory, reporting no pain, right inguinal hernia reduced at time of evaluation   - po challenge and outpatient follow up with Dr. Cheek       - Plan discussed with Attending, Dr. Cheek    [facility]  [id number]

## 2023-11-13 NOTE — ED PROVIDER NOTE - NSFOLLOWUPINSTRUCTIONS_ED_ALL_ED_FT
-   Please follow-up with the following physician included in this packet.  Is a  surgeon, and likely your hernia is going to need repair if it is bothering you over and over again.  Reasons to come back to the emergency room include but are not limited to pain that is out of control, skin changes over top of the hernia in question.    - Your testing/exams was/were reassuring that dangerous emergencies/conditions are less likely to be occurring or to have occurred.    - Take all medications, if given/sent to pharmacy, and as, directed.    - If you had labs or imaging done, you were given copies of all labs and/or imaging results from your er visit--please take them with you to your follow up appointments.  - If needed, call patient access services at [phone] to find a primary care physician (PCP). Call this number to follow up with a specialty service, such as the spine clinic. If you need this, call and say you were recently in the emergency department and you are calling, per my orders.   - Make sure you do not require a primary care physician's referral if you make a specialty clinic appointment directly. Some insurance requires you to see your PCP, get a referral, then make a specialty appointment.

## 2023-11-13 NOTE — ED ADULT NURSE REASSESSMENT NOTE - NS ED NURSE REASSESS COMMENT FT1
1:1 discontinued, pt sent home with granddaughter, given DC paperwork. Follow up explained. JOSE Elizabeth and MD Perdomo aware pt going back to assisted living with granddaughter.

## 2023-11-13 NOTE — CONSULT NOTE ADULT - SUBJECTIVE AND OBJECTIVE BOX
GENERAL SURGERY CONSULT NOTE  --------------------------------------------------------------------------------------------    HPI:   84 y/o male HTN, dementia with mood disorder presents to the ED for right groin pain that began yesterday. He was sent in from rehab. He is not able to provide a history, but he does not report any pain at the time of evaluation. The right inguinal hernia is reduced at the time of evaluation. He reports passing flatus and having bowel function yesterday.     General Surgery consulted for fat containing right inguinal hernia seen on CT imaging. At time of evaluation, patient did not have a right inguinal bulge, he was walking around and not reporting any pain.       ROS: 10-system review is otherwise negative except HPI above.      PAST MEDICAL & SURGICAL HISTORY:  Dementia      Hypertension        FAMILY HISTORY:  [] Family history not pertinent as reviewed with the patient and family    SOCIAL HISTORY:  n/a    ALLERGIES: No Known Allergies      HOME MEDICATIONS: n/a    CURRENT MEDICATIONS  MEDICATIONS (STANDING):   MEDICATIONS (PRN):  --------------------------------------------------------------------------------------------    Vitals:   T(C): 36.7 (11-13-23 @ 13:49), Max: 37.1 (11-13-23 @ 13:14)  HR: 66 (11-13-23 @ 13:49) (65 - 66)  BP: 128/70 (11-13-23 @ 13:49) (128/70 - 130/67)  RR: 18 (11-13-23 @ 13:49) (18 - 20)  SpO2: 97% (11-13-23 @ 13:49) (96% - 97%)  CAPILLARY BLOOD GLUCOSE          Height (cm): 154.9 (11-13 @ 13:14)  Weight (kg): 54.4 (11-13 @ 13:14)  BMI (kg/m2): 22.7 (11-13 @ 13:14)  BSA (m2): 1.52 (11-13 @ 13:14)    PHYSICAL EXAM:  General: NAD, Lying in bed comfortably, pleasantly confused   Resp: Good effort, CTA b/l  GI/Abd: Soft, NT/ND, no rebound/guarding, no masses palpated  Groin: R inguinal hernia defect appreciated, no bulge or mass appreciated   Vascular: All 4 extremities warm  --------------------------------------------------------------------------------------------    LABS  CBC (11-13 @ 14:19)                              13.5                           6.73    )----------------(  264        63.6  % Neutrophils, 24.2  % Lymphocytes, ANC: 4.28                                39.1      BMP (11-13 @ 14:19)             135     |  98      |  11    		Ca++ --      Ca 9.5                ---------------------------------( 99    		Mg --                 4.4     |  26      |  0.71  			Ph --        LFTs (11-13 @ 14:19)      TPro 7.0 / Alb 4.1 / TBili 0.4 / DBili -- / AST 14 / ALT 6<L> / AlkPhos 57    Coags (11-13 @ 14:19)  aPTT 35.0 / INR 0.99 / PT 10.9        VBG (11-13 @ 14:19)     7.37 / 55 / 27 / 32<H> / 5.0<H> / 31.1<L>%     Lactate: 0.9    --------------------------------------------------------------------------------------------    MICROBIOLOGY  Urinalysis (11-13 @ 14:19):     Color:  / Appearance:  / SG:  / pH:  / Gluc: 99 / Ketones:  / Bili:  / Urobili:  / Protein : / Nitrites:  / Leuk.Est:  / RBC:  / WBC:  / Sq Epi:  / Non Sq Epi:  / Bacteria          --------------------------------------------------------------------------------------------    IMAGING  < from: CT Abdomen and Pelvis w/ IV Cont (11.13.23 @ 16:00) >    FINDINGS:  LOWER CHEST: Coronary artery calcifications. Scarring/atelectasis at the   left lung base.    LIVER: Within normal limits.  BILE DUCTS: Postcholecystectomy biliary ductal dilatation.  GALLBLADDER: Surgically removed  SPLEEN: Within normal limits.  PANCREAS: Within normal limits.  ADRENALS: Within normal limits.  KIDNEYS/URETERS: Bilateral renal cysts and lesions too small to   characterize. No hydronephrosis.    BLADDER: Bladder stones, largest measuring 1.5 cm.  REPRODUCTIVE ORGANS: Enlarged    BOWEL: No bowel obstruction. Diverticulosis. Appendix is normal.  PERITONEUM: No ascites.  VESSELS: Atherosclerotic changes.  RETROPERITONEUM/LYMPH NODES: No lymphadenopathy.  ABDOMINAL WALL: Fat-containing right inguinal hernia with mild stranding   of the contain fat.  BONES: Osteopenia. Degenerative changes. Compression deformities of L1   and L4.    IMPRESSION:  *  Fat-containing right inguinal hernia.  *  No bowel obstruction.  *  Enlarged prostate with chronic changes of bladder outlet obstruction   and multiple bladder stones.    --- End of Report ---          RASHAD RIVERA MD; Resident Radiologist  This document has been electronically signed.    < end of copied text >      --------------------------------------------------------------------------------------------

## 2023-11-13 NOTE — ED PROVIDER NOTE - ATTENDING CONTRIBUTION TO CARE
Attending Statement (CAROLINE Sparrow MD):    HPI: 83-year-old male with history of HTN, dementia, presenting with right groin pain since yesterday.  Pain has been constant associated with nausea but no reported vomiting.  Unknown last BM but reports passing flatus.  No urinary complaints.  No fever.    Review of Systems:  -General: no fever   -ENT: no congestion  -Pulmonary: no cough, no shortness of breath  -Cardiac: no chest pain  -Gastrointestinal: + Right groin/abdominal pain, + nausea, no vomiting, and no diarrhea.  -Genitourinary: no blood or pain with urination  -Musculoskeletal: no back or neck pain  -Skin: no rashes  -Endocrine: No h/o diabetes  -Neurologic: No new weakness or numbness in extremities    All else negative unless otherwise specified elsewhere in this note.    On Physical Exam:  General: well appearing, in NAD, speaking clearly in full sentences and without difficulty; cooperative with exam  HEENT: anicteric sclera, airway patent  Neck: no JVD  Cardiac: regular, s1 s2  Lungs: CTABL  Abdomen: soft nontender/nondistended, mild right groin swelling that is tender no palpable masses no palpable bowel  : no bladder tenderness or distension  Skin: intact, no rash  Extremities: no peripheral edema, no gross deformities      MDM: 83-year-old male presenting with right groin pain with palpable swelling but not firm and unlikely to contain incarcerated bowel however given age will obtain CT abdomen pelvis to further evaluate for evidence of incarcerated or strangulated hernia.  Obtain screening labs CBC CMP and lactate.  Disposition pending review of labs and imaging, consider surgical consultation depending on results of imaging.  See progress notes above for updates ED clinical course and medical decision making.

## 2023-11-13 NOTE — ED ADULT NURSE NOTE - OBJECTIVE STATEMENT
pt here from assisted living with right groin pain he has a painful lump in the right groin area  pt is unreliable for medical history

## 2023-11-13 NOTE — ED PROVIDER NOTE - CARE PROVIDER_API CALL
Alanna Cheek  Colon/Rectal Surgery  [address], [facility]  [place] [address]  Phone: [phone]  Fax: [phone]  Follow Up Time: 4-6 Days

## 2023-11-13 NOTE — ED PROVIDER NOTE - NS ED ATTENDING STATEMENT MOD
Attending with I have seen and examined this patient and fully participated in the care of this patient as the teaching attending.  The service was shared with the OSMAN.  I reviewed and verified the documentation and independently performed the documented:

## 2023-11-14 NOTE — PROGRESS NOTE ADULT - ASSESSMENT
ASSESSMENT: 84 y/o male HTN, dementia with mood disorder presents to the ED for right groin pain that began yesterday. He was sent in from rehab. He is not able to provide a history, but he does not report any pain at the time of evaluation. The right inguinal hernia is reduced at the time of evaluation. He reports passing flatus and having bowel function yesterday. General Surgery consulted for fat containing right inguinal hernia seen on CT imaging. At time of evaluation, patient did not have a right inguinal bulge, he was walking around and not reporting any pain.     PLAN:    - patient ambulatory, reporting no pain, right inguinal hernia reduced at time of evaluation   - po challenge and outpatient follow up with Dr. Cheek   - Plan discussed with Attending, Dr. Cheek    [facility]  [id number]

## 2023-11-14 NOTE — PROGRESS NOTE ADULT - SUBJECTIVE AND OBJECTIVE BOX
GREEN TEAM SURGERY DAILY PROGRESS NOTE    SUBJECTIVE:     Overnight: VIJI PUGA.    Patient seen and evaluated on AM rounds.   Patient otherwise denies nausea, vomiting, chest pain, shortness of breath     OBJECTIVE:  Vital Signs Last 24 Hrs  T(C): 36.9 (13 Nov 2023 18:30), Max: 37.1 (13 Nov 2023 13:14)  T(F): 98.4 (13 Nov 2023 18:30), Max: 98.8 (13 Nov 2023 13:14)  HR: 68 (13 Nov 2023 18:30) (65 - 68)  BP: 122/68 (13 Nov 2023 18:30) (122/68 - 130/67)  BP(mean): --  RR: 18 (13 Nov 2023 18:30) (18 - 20)  SpO2: 97% (13 Nov 2023 18:30) (96% - 97%)    Parameters below as of 13 Nov 2023 18:30  Patient On (Oxygen Delivery Method): room air      STANDING    PRN      Labs:  135  |  98  |  11  ----------------------------<  99    (11-13)  4.4   |  26  |  0.71          Ca    9.5      11-13  Mg    x  Phos  x          Urinalysis Basic - ( 13 Nov 2023 14:19 )    Color: x / Appearance: x / SG: x / pH: x  Gluc: 99 mg/dL / Ketone: x  / Bili: x / Urobili: x   Blood: x / Protein: x / Nitrite: x   Leuk Esterase: x / RBC: x / WBC x   Sq Epi: x / Non Sq Epi: x / Bacteria: x      Urinalysis Basic - ( 13 Nov 2023 14:19 )    Color: x / Appearance: x / SG: x / pH: x  Gluc: 99 mg/dL / Ketone: x  / Bili: x / Urobili: x   Blood: x / Protein: x / Nitrite: x   Leuk Esterase: x / RBC: x / WBC x   Sq Epi: x / Non Sq Epi: x / Bacteria: x          Physical Exam:  General: NAD, Lying in bed comfortably, pleasantly confused   Resp: Good effort, CTA b/l  GI/Abd: Soft, NT/ND, no rebound/guarding, no masses palpated  Groin: R inguinal hernia defect appreciated, no bulge or mass appreciated   Vascular: All 4 extremities warm

## 2023-11-20 ENCOUNTER — EMERGENCY (EMERGENCY)
Facility: HOSPITAL | Age: 83
LOS: 1 days | Discharge: NURSING, REHAB OR LONG-TERM CARE | End: 2023-11-20
Attending: EMERGENCY MEDICINE
Payer: MEDICARE

## 2023-11-20 VITALS
TEMPERATURE: 99 F | SYSTOLIC BLOOD PRESSURE: 148 MMHG | DIASTOLIC BLOOD PRESSURE: 80 MMHG | HEIGHT: 61 IN | WEIGHT: 100.09 LBS | HEART RATE: 61 BPM | RESPIRATION RATE: 20 BRPM | OXYGEN SATURATION: 98 %

## 2023-11-20 DIAGNOSIS — Z98.890 OTHER SPECIFIED POSTPROCEDURAL STATES: Chronic | ICD-10-CM

## 2023-11-20 DIAGNOSIS — K25.5 CHRONIC OR UNSPECIFIED GASTRIC ULCER WITH PERFORATION: Chronic | ICD-10-CM

## 2023-11-20 LAB
ALBUMIN SERPL ELPH-MCNC: 4.5 G/DL — SIGNIFICANT CHANGE UP (ref 3.3–5)
ALBUMIN SERPL ELPH-MCNC: 4.5 G/DL — SIGNIFICANT CHANGE UP (ref 3.3–5)
ALP SERPL-CCNC: 60 U/L — SIGNIFICANT CHANGE UP (ref 40–120)
ALP SERPL-CCNC: 60 U/L — SIGNIFICANT CHANGE UP (ref 40–120)
ALT FLD-CCNC: 13 U/L — SIGNIFICANT CHANGE UP (ref 10–45)
ALT FLD-CCNC: 13 U/L — SIGNIFICANT CHANGE UP (ref 10–45)
ANION GAP SERPL CALC-SCNC: 10 MMOL/L — SIGNIFICANT CHANGE UP (ref 5–17)
ANION GAP SERPL CALC-SCNC: 10 MMOL/L — SIGNIFICANT CHANGE UP (ref 5–17)
AST SERPL-CCNC: 27 U/L — SIGNIFICANT CHANGE UP (ref 10–40)
AST SERPL-CCNC: 27 U/L — SIGNIFICANT CHANGE UP (ref 10–40)
BASE EXCESS BLDV CALC-SCNC: 3.2 MMOL/L — HIGH (ref -2–3)
BASE EXCESS BLDV CALC-SCNC: 3.2 MMOL/L — HIGH (ref -2–3)
BASOPHILS # BLD AUTO: 0.04 K/UL — SIGNIFICANT CHANGE UP (ref 0–0.2)
BASOPHILS # BLD AUTO: 0.04 K/UL — SIGNIFICANT CHANGE UP (ref 0–0.2)
BASOPHILS NFR BLD AUTO: 0.4 % — SIGNIFICANT CHANGE UP (ref 0–2)
BASOPHILS NFR BLD AUTO: 0.4 % — SIGNIFICANT CHANGE UP (ref 0–2)
BILIRUB SERPL-MCNC: 0.3 MG/DL — SIGNIFICANT CHANGE UP (ref 0.2–1.2)
BILIRUB SERPL-MCNC: 0.3 MG/DL — SIGNIFICANT CHANGE UP (ref 0.2–1.2)
BUN SERPL-MCNC: 12 MG/DL — SIGNIFICANT CHANGE UP (ref 7–23)
BUN SERPL-MCNC: 12 MG/DL — SIGNIFICANT CHANGE UP (ref 7–23)
CA-I SERPL-SCNC: 1.22 MMOL/L — SIGNIFICANT CHANGE UP (ref 1.15–1.33)
CA-I SERPL-SCNC: 1.22 MMOL/L — SIGNIFICANT CHANGE UP (ref 1.15–1.33)
CALCIUM SERPL-MCNC: 10 MG/DL — SIGNIFICANT CHANGE UP (ref 8.4–10.5)
CALCIUM SERPL-MCNC: 10 MG/DL — SIGNIFICANT CHANGE UP (ref 8.4–10.5)
CHLORIDE BLDV-SCNC: 99 MMOL/L — SIGNIFICANT CHANGE UP (ref 96–108)
CHLORIDE BLDV-SCNC: 99 MMOL/L — SIGNIFICANT CHANGE UP (ref 96–108)
CHLORIDE SERPL-SCNC: 100 MMOL/L — SIGNIFICANT CHANGE UP (ref 96–108)
CHLORIDE SERPL-SCNC: 100 MMOL/L — SIGNIFICANT CHANGE UP (ref 96–108)
CO2 BLDV-SCNC: 32 MMOL/L — HIGH (ref 22–26)
CO2 BLDV-SCNC: 32 MMOL/L — HIGH (ref 22–26)
CO2 SERPL-SCNC: 24 MMOL/L — SIGNIFICANT CHANGE UP (ref 22–31)
CO2 SERPL-SCNC: 24 MMOL/L — SIGNIFICANT CHANGE UP (ref 22–31)
CREAT SERPL-MCNC: 0.71 MG/DL — SIGNIFICANT CHANGE UP (ref 0.5–1.3)
CREAT SERPL-MCNC: 0.71 MG/DL — SIGNIFICANT CHANGE UP (ref 0.5–1.3)
EGFR: 92 ML/MIN/1.73M2 — SIGNIFICANT CHANGE UP
EGFR: 92 ML/MIN/1.73M2 — SIGNIFICANT CHANGE UP
EOSINOPHIL # BLD AUTO: 0.19 K/UL — SIGNIFICANT CHANGE UP (ref 0–0.5)
EOSINOPHIL # BLD AUTO: 0.19 K/UL — SIGNIFICANT CHANGE UP (ref 0–0.5)
EOSINOPHIL NFR BLD AUTO: 2.1 % — SIGNIFICANT CHANGE UP (ref 0–6)
EOSINOPHIL NFR BLD AUTO: 2.1 % — SIGNIFICANT CHANGE UP (ref 0–6)
GAS PNL BLDV: 132 MMOL/L — LOW (ref 136–145)
GAS PNL BLDV: 132 MMOL/L — LOW (ref 136–145)
GAS PNL BLDV: SIGNIFICANT CHANGE UP
GAS PNL BLDV: SIGNIFICANT CHANGE UP
GLUCOSE BLDV-MCNC: 93 MG/DL — SIGNIFICANT CHANGE UP (ref 70–99)
GLUCOSE BLDV-MCNC: 93 MG/DL — SIGNIFICANT CHANGE UP (ref 70–99)
GLUCOSE SERPL-MCNC: 91 MG/DL — SIGNIFICANT CHANGE UP (ref 70–99)
GLUCOSE SERPL-MCNC: 91 MG/DL — SIGNIFICANT CHANGE UP (ref 70–99)
HCO3 BLDV-SCNC: 30 MMOL/L — HIGH (ref 22–29)
HCO3 BLDV-SCNC: 30 MMOL/L — HIGH (ref 22–29)
HCT VFR BLD CALC: 42.9 % — SIGNIFICANT CHANGE UP (ref 39–50)
HCT VFR BLD CALC: 42.9 % — SIGNIFICANT CHANGE UP (ref 39–50)
HCT VFR BLDA CALC: 42 % — SIGNIFICANT CHANGE UP (ref 39–51)
HCT VFR BLDA CALC: 42 % — SIGNIFICANT CHANGE UP (ref 39–51)
HGB BLD CALC-MCNC: 14.1 G/DL — SIGNIFICANT CHANGE UP (ref 12.6–17.4)
HGB BLD CALC-MCNC: 14.1 G/DL — SIGNIFICANT CHANGE UP (ref 12.6–17.4)
HGB BLD-MCNC: 14.6 G/DL — SIGNIFICANT CHANGE UP (ref 13–17)
HGB BLD-MCNC: 14.6 G/DL — SIGNIFICANT CHANGE UP (ref 13–17)
IMM GRANULOCYTES NFR BLD AUTO: 0.4 % — SIGNIFICANT CHANGE UP (ref 0–0.9)
IMM GRANULOCYTES NFR BLD AUTO: 0.4 % — SIGNIFICANT CHANGE UP (ref 0–0.9)
LACTATE BLDV-MCNC: 1.1 MMOL/L — SIGNIFICANT CHANGE UP (ref 0.5–2)
LACTATE BLDV-MCNC: 1.1 MMOL/L — SIGNIFICANT CHANGE UP (ref 0.5–2)
LIDOCAIN IGE QN: 28 U/L — SIGNIFICANT CHANGE UP (ref 7–60)
LIDOCAIN IGE QN: 28 U/L — SIGNIFICANT CHANGE UP (ref 7–60)
LYMPHOCYTES # BLD AUTO: 1.63 K/UL — SIGNIFICANT CHANGE UP (ref 1–3.3)
LYMPHOCYTES # BLD AUTO: 1.63 K/UL — SIGNIFICANT CHANGE UP (ref 1–3.3)
LYMPHOCYTES # BLD AUTO: 17.7 % — SIGNIFICANT CHANGE UP (ref 13–44)
LYMPHOCYTES # BLD AUTO: 17.7 % — SIGNIFICANT CHANGE UP (ref 13–44)
MCHC RBC-ENTMCNC: 30.6 PG — SIGNIFICANT CHANGE UP (ref 27–34)
MCHC RBC-ENTMCNC: 30.6 PG — SIGNIFICANT CHANGE UP (ref 27–34)
MCHC RBC-ENTMCNC: 34 GM/DL — SIGNIFICANT CHANGE UP (ref 32–36)
MCHC RBC-ENTMCNC: 34 GM/DL — SIGNIFICANT CHANGE UP (ref 32–36)
MCV RBC AUTO: 89.9 FL — SIGNIFICANT CHANGE UP (ref 80–100)
MCV RBC AUTO: 89.9 FL — SIGNIFICANT CHANGE UP (ref 80–100)
MONOCYTES # BLD AUTO: 0.62 K/UL — SIGNIFICANT CHANGE UP (ref 0–0.9)
MONOCYTES # BLD AUTO: 0.62 K/UL — SIGNIFICANT CHANGE UP (ref 0–0.9)
MONOCYTES NFR BLD AUTO: 6.7 % — SIGNIFICANT CHANGE UP (ref 2–14)
MONOCYTES NFR BLD AUTO: 6.7 % — SIGNIFICANT CHANGE UP (ref 2–14)
NEUTROPHILS # BLD AUTO: 6.69 K/UL — SIGNIFICANT CHANGE UP (ref 1.8–7.4)
NEUTROPHILS # BLD AUTO: 6.69 K/UL — SIGNIFICANT CHANGE UP (ref 1.8–7.4)
NEUTROPHILS NFR BLD AUTO: 72.7 % — SIGNIFICANT CHANGE UP (ref 43–77)
NEUTROPHILS NFR BLD AUTO: 72.7 % — SIGNIFICANT CHANGE UP (ref 43–77)
NRBC # BLD: 0 /100 WBCS — SIGNIFICANT CHANGE UP (ref 0–0)
NRBC # BLD: 0 /100 WBCS — SIGNIFICANT CHANGE UP (ref 0–0)
PCO2 BLDV: 53 MMHG — SIGNIFICANT CHANGE UP (ref 42–55)
PCO2 BLDV: 53 MMHG — SIGNIFICANT CHANGE UP (ref 42–55)
PH BLDV: 7.36 — SIGNIFICANT CHANGE UP (ref 7.32–7.43)
PH BLDV: 7.36 — SIGNIFICANT CHANGE UP (ref 7.32–7.43)
PLATELET # BLD AUTO: 240 K/UL — SIGNIFICANT CHANGE UP (ref 150–400)
PLATELET # BLD AUTO: 240 K/UL — SIGNIFICANT CHANGE UP (ref 150–400)
PO2 BLDV: 42 MMHG — SIGNIFICANT CHANGE UP (ref 25–45)
PO2 BLDV: 42 MMHG — SIGNIFICANT CHANGE UP (ref 25–45)
POTASSIUM BLDV-SCNC: 5.1 MMOL/L — SIGNIFICANT CHANGE UP (ref 3.5–5.1)
POTASSIUM BLDV-SCNC: 5.1 MMOL/L — SIGNIFICANT CHANGE UP (ref 3.5–5.1)
POTASSIUM SERPL-MCNC: 5.4 MMOL/L — HIGH (ref 3.5–5.3)
POTASSIUM SERPL-MCNC: 5.4 MMOL/L — HIGH (ref 3.5–5.3)
POTASSIUM SERPL-SCNC: 5.4 MMOL/L — HIGH (ref 3.5–5.3)
POTASSIUM SERPL-SCNC: 5.4 MMOL/L — HIGH (ref 3.5–5.3)
PROT SERPL-MCNC: 7.4 G/DL — SIGNIFICANT CHANGE UP (ref 6–8.3)
PROT SERPL-MCNC: 7.4 G/DL — SIGNIFICANT CHANGE UP (ref 6–8.3)
RBC # BLD: 4.77 M/UL — SIGNIFICANT CHANGE UP (ref 4.2–5.8)
RBC # BLD: 4.77 M/UL — SIGNIFICANT CHANGE UP (ref 4.2–5.8)
RBC # FLD: 13.6 % — SIGNIFICANT CHANGE UP (ref 10.3–14.5)
RBC # FLD: 13.6 % — SIGNIFICANT CHANGE UP (ref 10.3–14.5)
SAO2 % BLDV: 55.7 % — LOW (ref 67–88)
SAO2 % BLDV: 55.7 % — LOW (ref 67–88)
SODIUM SERPL-SCNC: 134 MMOL/L — LOW (ref 135–145)
SODIUM SERPL-SCNC: 134 MMOL/L — LOW (ref 135–145)
WBC # BLD: 9.21 K/UL — SIGNIFICANT CHANGE UP (ref 3.8–10.5)
WBC # BLD: 9.21 K/UL — SIGNIFICANT CHANGE UP (ref 3.8–10.5)
WBC # FLD AUTO: 9.21 K/UL — SIGNIFICANT CHANGE UP (ref 3.8–10.5)
WBC # FLD AUTO: 9.21 K/UL — SIGNIFICANT CHANGE UP (ref 3.8–10.5)

## 2023-11-20 PROCEDURE — 99285 EMERGENCY DEPT VISIT HI MDM: CPT | Mod: 25

## 2023-11-20 PROCEDURE — 85025 COMPLETE CBC W/AUTO DIFF WBC: CPT

## 2023-11-20 PROCEDURE — 84295 ASSAY OF SERUM SODIUM: CPT

## 2023-11-20 PROCEDURE — 82330 ASSAY OF CALCIUM: CPT

## 2023-11-20 PROCEDURE — 99285 EMERGENCY DEPT VISIT HI MDM: CPT | Mod: GC

## 2023-11-20 PROCEDURE — 85014 HEMATOCRIT: CPT

## 2023-11-20 PROCEDURE — 82435 ASSAY OF BLOOD CHLORIDE: CPT

## 2023-11-20 PROCEDURE — 83690 ASSAY OF LIPASE: CPT

## 2023-11-20 PROCEDURE — 96374 THER/PROPH/DIAG INJ IV PUSH: CPT

## 2023-11-20 PROCEDURE — 85018 HEMOGLOBIN: CPT

## 2023-11-20 PROCEDURE — 82803 BLOOD GASES ANY COMBINATION: CPT

## 2023-11-20 PROCEDURE — 99283 EMERGENCY DEPT VISIT LOW MDM: CPT | Mod: GC

## 2023-11-20 PROCEDURE — 82947 ASSAY GLUCOSE BLOOD QUANT: CPT

## 2023-11-20 PROCEDURE — 84132 ASSAY OF SERUM POTASSIUM: CPT

## 2023-11-20 PROCEDURE — 80053 COMPREHEN METABOLIC PANEL: CPT

## 2023-11-20 PROCEDURE — 83605 ASSAY OF LACTIC ACID: CPT

## 2023-11-20 RX ORDER — ACETAMINOPHEN 500 MG
675 TABLET ORAL ONCE
Refills: 0 | Status: COMPLETED | OUTPATIENT
Start: 2023-11-20 | End: 2023-11-20

## 2023-11-20 RX ADMIN — Medication 270 MILLIGRAM(S): at 20:47

## 2023-11-20 NOTE — ED PROVIDER NOTE - PHYSICAL EXAMINATION
PHYSICAL EXAM:  GENERAL: NAD, lying in bed comfortably  HEAD:  Atraumatic, Normocephalic  EYES: EOMI, conjunctiva and sclera clear  ENT: Moist mucous membranes  CHEST/LUNG: Clear to auscultation bilaterally; No rales, rhonchi, wheezing, or rubs. Unlabored respirations  HEART: Regular rate and rhythm; No murmurs, rubs, or gallops  ABDOMEN: Bowel sounds present; Soft, Nontender, Nondistended. No hepatomegally; R inguinal hernia present, reducible, minimally tender  EXTREMITIES:  2+ Peripheral Pulses, brisk capillary refill. No clubbing, cyanosis, or edema  NERVOUS SYSTEM:  Alert & Oriented X3, speech clear. No deficits   MSK: FROM all 4 extremities, full and equal strength  SKIN: No rashes or lesions

## 2023-11-20 NOTE — CONSULT NOTE ADULT - ATTENDING COMMENTS
Pt with inguinal hernia, + pain although none on exam.  At assisted living facility, and after contacting granddaughter patient is DNR/DNI, with no surgical intervention wanted.    Given MOLST, no indication for repair at this time.  Patient can follow up with Dr. Cheek if these plans change for operative planning.    Ivis Ferris MD

## 2023-11-20 NOTE — ED PROVIDER NOTE - NSFOLLOWUPINSTRUCTIONS_ED_ALL_ED_FT
You were seen in the ED to evaluate pain in your R leg related to your hernia. The hernia was reducible, so there is low concern that the hernia is stuck and at risk of losing its blood supply. You were seen by the surgery team, who offered surgery to repair the hernia, however you and your family were not interested at this time. Please return to the ED if you notice severe pain in the area that does not resolve on its own or if the hernia isn't able to be pushed back. Provided is surgery clinic information to follow with outside the hospital.

## 2023-11-20 NOTE — CONSULT NOTE ADULT - ASSESSMENT
ASSESSMENT: 82yo M w R inguinal hernia, reducible. Surgery offered for repair, however pt and family refusing surgery.     RECOMMENDATIONS:   - dispo per ED  - advised family to discuss goals with BETZAIDA, given only treatment option is surgery which family is refusing    Discussed with surgical fellow on behalf of attending     [id number]

## 2023-11-20 NOTE — CONSULT NOTE ADULT - SUBJECTIVE AND OBJECTIVE BOX
BLAYNE DELATORRECARDO [id number]  83y Male      HPI: 82 y/o male HTN, dementia (A&Ox2) with mood disorder presents to the ED for right groin pain that began this AM. Pt was seen in ED by surgical team at [facility] 1 week ago with the same sx, hernia self-reduced in ED. He was sent in from rehab. He is not able to provide a history, but he does not report any pain at the time of evaluation. The right inguinal hernia was reduced at bedside without issue. He reports passing flatus today and having bowel function yesterday.     Extensive GOC undertaken w patient granddaughter who is pts' HCP ([phone], Shameka Dmitri). She states that he is DNR/DNI and that surgery, even low-risk surgery, is not within pt's goals of care and is not aligned with his values. Risks associated with incarceration/strangulation of hernia w bowel involvements explained and pt family expressed understanding, however still did not wish to proceed with surgery. They stated they wished to discuss further later on but did not want to pursue operative planning at this time.       PAST MEDICAL & SURGICAL HISTORY:  Dementia      Hypertension            MEDICATIONS  (STANDING):    MEDICATIONS  (PRN):      Allergies    No Known Allergies    Intolerances        REVIEW OF SYSTEMS    [X] A ten-point review of systems was otherwise negative except as noted.  [ ] Due to altered mental status/intubation, subjective information were not able to be obtained from the patient. History was obtained, to the extent possible, from review of the chart and collateral sources of information.      Vital Signs Last 24 Hrs  T(C): 37 (20 Nov 2023 17:26), Max: 37 (20 Nov 2023 17:26)  T(F): 98.6 (20 Nov 2023 17:26), Max: 98.6 (20 Nov 2023 17:26)  HR: 61 (20 Nov 2023 17:26) (61 - 61)  BP: 148/80 (20 Nov 2023 17:26) (148/80 - 148/80)  BP(mean): --  RR: 20 (20 Nov 2023 17:26) (20 - 20)  SpO2: 98% (20 Nov 2023 17:26) (98% - 98%)    Parameters below as of 20 Nov 2023 17:26  Patient On (Oxygen Delivery Method): room air        PHYSICAL EXAM:  GENERAL: NAD, well-appearing  CHEST/LUNG: Clear to auscultation bilaterally  HEART: Regular rate and rhythm  ABDOMEN: Soft, Nontender, Nondistended; R inguinal bulge noted, reduced with manual pressure  EXTREMITIES:  No clubbing, cyanosis, or edema      LABS:  Labs:  CAPILLARY BLOOD GLUCOSE                              14.6   9.21  )-----------( 240      ( 20 Nov 2023 18:54 )             42.9       Auto Neutrophil %: 72.7 % (11-20-23 @ 18:54)  Auto Immature Granulocyte %: 0.4 % (11-20-23 @ 18:54)    11-20    134<L>  |  100  |  12  ----------------------------<  91  5.4<H>   |  24  |  0.71      Calcium: 10.0 mg/dL (11-20-23 @ 18:54)      LFTs:             7.4  | 0.3  | 27       ------------------[60      ( 20 Nov 2023 18:54 )  4.5  | x    | 13          Lipase:28     Amylase:x         Blood Gas Venous - Lactate: 1.1 mmol/L (11-20-23 @ 18:54)      Coags:            Urinalysis Basic - ( 20 Nov 2023 18:54 )    Color: x / Appearance: x / SG: x / pH: x  Gluc: 91 mg/dL / Ketone: x  / Bili: x / Urobili: x   Blood: x / Protein: x / Nitrite: x   Leuk Esterase: x / RBC: x / WBC x   Sq Epi: x / Non Sq Epi: x / Bacteria: x

## 2023-11-20 NOTE — ED PROVIDER NOTE - OBJECTIVE STATEMENT
83 M h/o HTN, dementia p/w R groin pain that began this AM. States that he was sitting at home when pain came on unprovoked in region of his hernia. Has had pain in the area in the past which comes and goes. Felt pain this morning was different though unable to explain what feels different. Reason he came to ED is because of suggestion of family/friends around him. Denies other sxs or complaints. States pain is now resolved.

## 2023-11-20 NOTE — ED PROVIDER NOTE - CLINICAL SUMMARY MEDICAL DECISION MAKING FREE TEXT BOX
Marily: 83 year old male with pmhx of inguinal hernia, dementia, here for right inguinal pain. sent from rehab for eval. poor historian first said last bm 2 days ago, then said yestereday. had bm in ER and states feels better.   PE: att exam: patient awake alert NAD. LUNGS CTAB no wheeze no crackle. CARD RRR no m/r/g.  Abdomen soft NT ND, + ttp right inguinal hernia with no skin changes, non reducible, no CVA tenderness gu: testic. EXT WWP no edema no calf tenderness CV 2+DP/PT bilaterally. neuro A&Ox3 gait normal.  skin warm and dry no rash  Plan: Patient with right inguinal hernia. will get labs, surgeryconsult, likely dc back to rehab with close surgery f/u. Marily: 83 year old male with pmhx of inguinal hernia, dementia, here for right inguinal pain. sent from rehab for eval. Poor historian, however states pain is now resolved. Presented 1 week ago with same complaint, arranged for outpt surgery f/u  PE: att exam: patient awake alert NAD. LUNGS CTAB no wheeze no crackle. CARD RRR no m/r/g.  Abdomen soft NT ND, + ttp right inguinal hernia with no skin changes, reducible gu: testic. EXT WWP no edema no calf tenderness CV 2+DP/PT bilaterally. neuro A&Ox3 gait normal.  skin warm and dry no rash  Plan: Patient with right reducible inguinal hernia. will get labs, dc back to rehab with close surgery f/u.

## 2023-11-20 NOTE — ED PROVIDER NOTE - PATIENT PORTAL LINK FT
You can access the FollowMyHealth Patient Portal offered by [facility] by registering at the following website: [email or web address]. By joining [employer]’s FollowMyHealth portal, you will also be able to view your health information using other applications (apps) compatible with our system.

## 2023-11-20 NOTE — ED ADULT NURSE NOTE - NSFALLHARMRISKINTERV_ED_ALL_ED

## 2023-11-20 NOTE — ED ADULT NURSE NOTE - OBJECTIVE STATEMENT
83 year ld male form assisted living BIBA for and pain Pt has right groin hernia seen 5 days ago for the same issue Pt also endorses no BM for 2 days Bu had a BM while here now ans feels better no more abd pain .  Pt has right groin pain at the site of the hernias when palpated no fever chills no n/v noted IVL placed and blood sent as ordered MpbriannaRn v

## 2023-11-21 VITALS
SYSTOLIC BLOOD PRESSURE: 141 MMHG | TEMPERATURE: 98 F | RESPIRATION RATE: 16 BRPM | HEART RATE: 68 BPM | DIASTOLIC BLOOD PRESSURE: 72 MMHG | OXYGEN SATURATION: 98 %

## 2023-11-21 NOTE — PROGRESS NOTE ADULT - ASSESSMENT
84yo M w/ PMH of HTN, dementia (A&Ox2) with mood disorder presented overnight with a R inguinal hernia, reducible. Surgery offered for repair, however pt and family refusing surgery.     RECOMMENDATIONS:   - dispo per ED  - advised family to discuss goals with [facility], given only treatment option is surgery which family is refusing    Discussed with surgical fellow on behalf of attending     [facility] [phone]

## 2023-11-21 NOTE — ED ADULT NURSE REASSESSMENT NOTE - NS ED NURSE REASSESS COMMENT FT1
RN attempted report to [facility] [phone]. RN received no answer, RN received voicemail. Charge RN made aware.

## 2023-11-21 NOTE — PROGRESS NOTE ADULT - SUBJECTIVE AND OBJECTIVE BOX
[facility] DAILY PROGRESS NOTE    SUBJECTIVE: patient seen and evaluated this am.    OBJECTIVE:  Vital Signs Last 24 Hrs  T(C): 36.7 (21 Nov 2023 01:13), Max: 37 (20 Nov 2023 17:26)  T(F): 98 (21 Nov 2023 01:13), Max: 98.6 (20 Nov 2023 17:26)  HR: 68 (21 Nov 2023 01:13) (61 - 68)  BP: 141/72 (21 Nov 2023 01:13) (141/72 - 148/80)  BP(mean): --  RR: 16 (21 Nov 2023 01:13) (16 - 20)  SpO2: 98% (21 Nov 2023 01:13) (98% - 99%)    Parameters below as of 21 Nov 2023 01:13  Patient On (Oxygen Delivery Method): room air      Daily Height in cm: 154.94 (20 Nov 2023 17:26)      Labs:  134<L>  |  100  |  12  ----------------------------<  91    (11-20)  5.4<H>   |  24  |  0.71          Ca    10.0      11-20  Mg    x  Phos  x          Urinalysis Basic - ( 20 Nov 2023 18:54 )    Color: x / Appearance: x / SG: x / pH: x  Gluc: 91 mg/dL / Ketone: x  / Bili: x / Urobili: x   Blood: x / Protein: x / Nitrite: x   Leuk Esterase: x / RBC: x / WBC x   Sq Epi: x / Non Sq Epi: x / Bacteria: x      Physical Exam:  General: NAD, well appearing  Respiratory: respirations non labored, clear to ascultation bilaterally  Cardiac: Regular rate and rhythm  Abdominal: Soft, Nontender, Nondistended; R inguinal bulge noted, reduced with manual pressure  Extremities: FROM, warm, no clubbing, cyanosis, or edema

## 2025-01-22 ENCOUNTER — APPOINTMENT (OUTPATIENT)
Dept: SURGERY | Facility: CLINIC | Age: 85
End: 2025-01-22
Payer: MEDICARE

## 2025-01-22 VITALS — BODY MASS INDEX: 19.63 KG/M2 | HEIGHT: 64 IN | WEIGHT: 115 LBS

## 2025-01-22 DIAGNOSIS — G30.9 ALZHEIMER'S DISEASE, UNSPECIFIED: ICD-10-CM

## 2025-01-22 DIAGNOSIS — F20.9 SCHIZOPHRENIA, UNSPECIFIED: ICD-10-CM

## 2025-01-22 DIAGNOSIS — I10 ESSENTIAL (PRIMARY) HYPERTENSION: ICD-10-CM

## 2025-01-22 DIAGNOSIS — F02.80 ALZHEIMER'S DISEASE, UNSPECIFIED: ICD-10-CM

## 2025-01-22 PROCEDURE — 99215 OFFICE O/P EST HI 40 MIN: CPT

## 2025-01-22 RX ORDER — TAMSULOSIN HYDROCHLORIDE 0.4 MG/1
CAPSULE ORAL
Refills: 0 | Status: ACTIVE | COMMUNITY

## 2025-01-22 RX ORDER — AMLODIPINE BESYLATE 5 MG/1
TABLET ORAL
Refills: 0 | Status: ACTIVE | COMMUNITY

## 2025-01-22 RX ORDER — MEMANTINE HYDROCHLORIDE 5 MG/1
TABLET ORAL
Refills: 0 | Status: ACTIVE | COMMUNITY

## 2025-01-22 RX ORDER — TRAMADOL HYDROCHLORIDE 75 MG/1
75 TABLET, COATED ORAL
Refills: 0 | Status: ACTIVE | COMMUNITY

## 2025-01-22 RX ORDER — FAMOTIDINE 10 MG/1
TABLET, FILM COATED ORAL
Refills: 0 | Status: ACTIVE | COMMUNITY

## 2025-01-22 RX ORDER — DONEPEZIL HYDROCHLORIDE 10 MG/1
10 TABLET ORAL
Refills: 0 | Status: ACTIVE | COMMUNITY

## 2025-05-29 NOTE — ED ADULT NURSE NOTE - NSFALLRSKOUTCOME_ED_ALL_ED
Pt is doing okay. Pt will be having a procedure done on 6/2. ED navigator ensured there was nothing she could help patient with today. ED navigator reminded patient to reach out if there is ever anything she can assist with. ED navigator plans to follow-up with patient on/around 6/27/2025.    Sanjuana Guillermo  ED Navigator  [phone]    Universal Safety Interventions

## 2025-07-09 ENCOUNTER — APPOINTMENT (OUTPATIENT)
Dept: SURGERY | Facility: CLINIC | Age: 85
End: 2025-07-09

## 2025-07-15 NOTE — ED PROVIDER NOTE - NS_EDPROVIDERDISPOUSERTYPE_ED_A_ED
upper airway obstruction, pharyngitis, asthma, vasculitis complication/progression. Differential Diagnosis Attending Attestation (For Attendings USE Only)...